# Patient Record
Sex: MALE | Race: BLACK OR AFRICAN AMERICAN | NOT HISPANIC OR LATINO | Employment: OTHER | URBAN - METROPOLITAN AREA
[De-identification: names, ages, dates, MRNs, and addresses within clinical notes are randomized per-mention and may not be internally consistent; named-entity substitution may affect disease eponyms.]

---

## 2017-01-20 ENCOUNTER — OFFICE VISIT (OUTPATIENT)
Dept: INTERNAL MEDICINE | Facility: CLINIC | Age: 42
End: 2017-01-20

## 2017-01-20 VITALS
BODY MASS INDEX: 24.52 KG/M2 | WEIGHT: 181 LBS | HEIGHT: 72 IN | DIASTOLIC BLOOD PRESSURE: 88 MMHG | SYSTOLIC BLOOD PRESSURE: 140 MMHG

## 2017-01-20 DIAGNOSIS — R10.13 EPIGASTRIC PAIN: Primary | ICD-10-CM

## 2017-01-20 LAB
ALBUMIN SERPL-MCNC: 4.5 G/DL (ref 3.2–4.8)
ALBUMIN/GLOB SERPL: 1.6 G/DL (ref 1.5–2.5)
ALP SERPL-CCNC: 58 U/L (ref 25–100)
ALT SERPL W P-5'-P-CCNC: 28 U/L (ref 7–40)
AMYLASE SERPL-CCNC: 56 U/L (ref 30–118)
ANION GAP SERPL CALCULATED.3IONS-SCNC: 9 MMOL/L (ref 3–11)
AST SERPL-CCNC: 27 U/L (ref 0–33)
BILIRUB SERPL-MCNC: 0.7 MG/DL (ref 0.3–1.2)
BUN BLD-MCNC: 11 MG/DL (ref 9–23)
BUN/CREAT SERPL: 13.8 (ref 7–25)
CALCIUM SPEC-SCNC: 10 MG/DL (ref 8.7–10.4)
CHLORIDE SERPL-SCNC: 105 MMOL/L (ref 99–109)
CO2 SERPL-SCNC: 32 MMOL/L (ref 20–31)
CREAT BLD-MCNC: 0.8 MG/DL (ref 0.6–1.3)
DEPRECATED RDW RBC AUTO: 41.1 FL (ref 37–54)
ERYTHROCYTE [DISTWIDTH] IN BLOOD BY AUTOMATED COUNT: 12.2 % (ref 11.3–14.5)
GFR SERPL CREATININE-BSD FRML MDRD: 130 ML/MIN/1.73
GLOBULIN UR ELPH-MCNC: 2.8 GM/DL
GLUCOSE BLD-MCNC: 102 MG/DL (ref 70–100)
HCT VFR BLD AUTO: 44.2 % (ref 38.9–50.9)
HGB BLD-MCNC: 15.1 G/DL (ref 13.1–17.5)
LIPASE SERPL-CCNC: 36 U/L (ref 6–51)
MCH RBC QN AUTO: 31.3 PG (ref 27–31)
MCHC RBC AUTO-ENTMCNC: 34.2 G/DL (ref 32–36)
MCV RBC AUTO: 91.5 FL (ref 80–99)
PLATELET # BLD AUTO: 218 10*3/MM3 (ref 150–450)
PMV BLD AUTO: 10 FL (ref 6–12)
POTASSIUM BLD-SCNC: 4.8 MMOL/L (ref 3.5–5.5)
PROT SERPL-MCNC: 7.3 G/DL (ref 5.7–8.2)
RBC # BLD AUTO: 4.83 10*6/MM3 (ref 4.2–5.76)
SODIUM BLD-SCNC: 146 MMOL/L (ref 132–146)
WBC NRBC COR # BLD: 5.91 10*3/MM3 (ref 3.5–10.8)

## 2017-01-20 PROCEDURE — 85027 COMPLETE CBC AUTOMATED: CPT | Performed by: PHYSICIAN ASSISTANT

## 2017-01-20 PROCEDURE — 99213 OFFICE O/P EST LOW 20 MIN: CPT | Performed by: PHYSICIAN ASSISTANT

## 2017-01-20 PROCEDURE — 83690 ASSAY OF LIPASE: CPT | Performed by: PHYSICIAN ASSISTANT

## 2017-01-20 PROCEDURE — 83013 H PYLORI (C-13) BREATH: CPT | Performed by: PHYSICIAN ASSISTANT

## 2017-01-20 PROCEDURE — 80053 COMPREHEN METABOLIC PANEL: CPT | Performed by: PHYSICIAN ASSISTANT

## 2017-01-20 PROCEDURE — 82150 ASSAY OF AMYLASE: CPT | Performed by: PHYSICIAN ASSISTANT

## 2017-01-20 RX ORDER — OMEPRAZOLE 40 MG/1
40 CAPSULE, DELAYED RELEASE ORAL DAILY
Qty: 30 CAPSULE | Refills: 2 | Status: SHIPPED | OUTPATIENT
Start: 2017-01-20 | End: 2018-02-23

## 2017-01-20 NOTE — MR AVS SNAPSHOT
Alex Fofana   1/20/2017 9:15 AM   Office Visit    Dept Phone:  [phone]   Encounter #:  [id number]    Provider:  HEVER Mckinney   Department:  [facility] INTERNAL MEDICINE AND ENDOCRINOLOGY [place]                Your Full Care Plan              Today's Medication Changes          These changes are accurate as of: 1/20/17  9:53 AM.  If you have any questions, ask your nurse or doctor.               New Medication(s)Ordered:     omeprazole 40 MG capsule   Commonly known as:  priLOSEC   Take 1 capsule by mouth Daily.            Where to Get Your Medications      These medications were sent to [address] - [address] [place] [address] [address] - [phone]  - [phone] [place] [address]     Phone:  [phone]     omeprazole 40 MG capsule                  Your Updated Medication List          This list is accurate as of: 1/20/17  9:53 AM.  Always use your most recent med list.                omeprazole 40 MG capsule   Commonly known as:  priLOSEC   Take 1 capsule by mouth Daily.               We Performed the Following     Amylase     CBC (No Diff)     Comprehensive Metabolic Panel     H. Pylori Breath Test     Lipase       You Were Diagnosed With        Codes Comments    Epigastric pain    -  Primary ICD-10-CM: R10.13  ICD-9-CM: 789.06       Instructions     None    Patient Instructions History      Upcoming Appointments     Visit Type Date Time Department    SAME DAY 1/20/2017  9:15 AM [facility]    FOLLOW UP 2/17/2017  9:00 AM [facility]      [employer] Signup     [facility] [employer] allows you to send messages to your doctor, view your test results, renew your prescriptions, schedule appointments, and more. To sign up, go to [email or web address] and click on the Sign Up Now link in the New User? box. Enter your [employer] Activation Code exactly as it appears below along with the last four digits of your  "Social Security Number and your Date of Birth () to complete the sign-up process. If you do not sign up before the expiration date, you must request a new code.    [employer] Activation Code: [email or web address]  Expires: 2/3/2017  9:53 AM    If you have questions, you can email [email or web address] or call [phone] to talk to our [employer] staff. Remember, [employer] is NOT to be used for urgent needs. For medical emergencies, dial 911.               Other Info from Your Visit           Your Appointments     2017  9:00 AM EST   Follow Up with HEVER Mckinney   [facility] INTERNAL MEDICINE AND ENDOCRINOLOGY [place] (--)    [address] [place] [id number] [phone]           Arrive 15 minutes prior to appointment.              Allergies     Erythromycin        Reason for Visit     Stomach pain comes and goes x3 months           Vital Signs     Blood Pressure Height Weight Body Mass Index          140/88 71.5\" (181.6 cm) 181 lb (82.1 kg) 24.89 kg/m2        Problems and Diagnoses Noted     Abdominal pain, pit of stomach    -  Primary      Results         "

## 2017-01-20 NOTE — ASSESSMENT & PLAN NOTE
Likely esophagitis/gastritis. Check h.pylori, cmp, cbc, amylase, lipase. Start omeprazole daily. Recheck in 4 weeks.

## 2017-01-20 NOTE — PROGRESS NOTES
"Chief Complaint   Patient presents with   • Stomach pain comes and goes x3 months       Subjective   Alex Fofana is a 41 y.o. male.       History of Present Illness     For the past few months pt has been having epigastric pain after he eats. Ate rice and chicken last night and still hurts today. Has not tried anything otc for it. Today feels more nauseated. Normal BM, no vomiting. Does not happen as much after breakfast, seems worse later in the day. Has increased belching. Rarely takes NSAIDs, rare alcohol.    Notes that several years ago he had acid reflux, diagnosed with esophagitis on EGD.         Current Outpatient Prescriptions:   •  omeprazole (priLOSEC) 40 MG capsule, Take 1 capsule by mouth Daily., Disp: 30 capsule, Rfl: 2     PMFSH  The following portions of the patient's history were reviewed and updated as appropriate: allergies, current medications, past family history, past medical history, past social history, past surgical history and problem list.    Review of Systems   Constitutional: Negative for chills, diaphoresis, fatigue, fever and unexpected weight change.   HENT: Negative.    Respiratory: Negative for cough, chest tightness, shortness of breath and wheezing.    Cardiovascular: Negative for chest pain and palpitations.   Gastrointestinal: Positive for abdominal pain and nausea. Negative for blood in stool, constipation, diarrhea, rectal pain and vomiting.   Genitourinary: Negative.    Musculoskeletal: Negative.    Neurological: Negative for dizziness, weakness and light-headedness.   Psychiatric/Behavioral: Negative.        Objective   Visit Vitals   • /88   • Ht 71.5\" (181.6 cm)   • Wt 181 lb (82.1 kg)   • BMI 24.89 kg/m2       Physical Exam   Constitutional: He is oriented to person, place, and time. He appears well-developed and well-nourished.   HENT:   Head: Normocephalic and atraumatic.   Right Ear: External ear normal.   Left Ear: External ear normal.   Eyes: Conjunctivae are " normal. Pupils are equal, round, and reactive to light.   Neck: Normal range of motion.   Pulmonary/Chest: Effort normal.   Abdominal: Soft. Normal appearance and bowel sounds are normal. There is hepatosplenomegaly. There is tenderness in the epigastric area. There is no rigidity, no rebound, no guarding and no CVA tenderness.   Musculoskeletal: Normal range of motion.   Neurological: He is alert and oriented to person, place, and time.   Skin: Skin is warm and dry.   Psychiatric: He has a normal mood and affect. His behavior is normal. Judgment and thought content normal.       No results found for this or any previous visit.     ASSESSMENT/PLAN    Problem List Items Addressed This Visit        Nervous and Auditory    Epigastric pain - Primary     Likely esophagitis/gastritis. Check h.pylori, cmp, cbc, amylase, lipase. Start omeprazole daily. Recheck in 4 weeks.         Relevant Medications    omeprazole (priLOSEC) 40 MG capsule    Other Relevant Orders    Comprehensive Metabolic Panel    CBC (No Diff)    Amylase    Lipase    H. Pylori Breath Test               Return in about 4 weeks (around 2/17/2017) for Recheck.

## 2017-01-26 LAB — UREA BREATH TEST QL: NEGATIVE

## 2017-06-16 ENCOUNTER — OFFICE VISIT (OUTPATIENT)
Dept: INTERNAL MEDICINE | Facility: CLINIC | Age: 42
End: 2017-06-16

## 2017-06-16 VITALS
DIASTOLIC BLOOD PRESSURE: 80 MMHG | HEART RATE: 68 BPM | BODY MASS INDEX: 24.34 KG/M2 | OXYGEN SATURATION: 99 % | WEIGHT: 177 LBS | SYSTOLIC BLOOD PRESSURE: 144 MMHG

## 2017-06-16 DIAGNOSIS — M54.16 RIGHT LUMBAR RADICULOPATHY: Primary | ICD-10-CM

## 2017-06-16 PROCEDURE — 99213 OFFICE O/P EST LOW 20 MIN: CPT | Performed by: PHYSICIAN ASSISTANT

## 2017-06-16 RX ORDER — METHYLPREDNISOLONE 4 MG/1
TABLET ORAL
Qty: 21 TABLET | Refills: 0 | Status: SHIPPED | OUTPATIENT
Start: 2017-06-16 | End: 2018-02-23

## 2017-06-16 RX ORDER — CYCLOBENZAPRINE HCL 5 MG
5 TABLET ORAL 3 TIMES DAILY PRN
Qty: 30 TABLET | Refills: 0 | Status: SHIPPED | OUTPATIENT
Start: 2017-06-16 | End: 2018-02-23

## 2017-06-16 NOTE — PROGRESS NOTES
Chief Complaint   Patient presents with   • Lower back and right leg pain x3 weeks       Subjective   Alex Fofana is a 41 y.o. male.       History of Present Illness     About 3 weeks ago pt developed pain in his lower back. 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 He woke up with the pain in his back and radiation down his left leg. Went to chiropractor and radiation down his left leg resolved and then moved to the right side. Has pain all the time, worse with certain positions, not much better at any times. Tried ibuprofen once without any relief. His job does not seem to make it worse. Sometimes sitting for long periods is worse.      Current Outpatient Prescriptions:   •  omeprazole (priLOSEC) 40 MG capsule, Take 1 capsule by mouth Daily., Disp: 30 capsule, Rfl: 2  •  cyclobenzaprine (FLEXERIL) 5 MG tablet, Take 1 tablet by mouth 3 (Three) Times a Day As Needed for Muscle Spasms., Disp: 30 tablet, Rfl: 0  •  MethylPREDNISolone (MEDROL) 4 MG tablet, follow package directions, Disp: 21 tablet, Rfl: 0     PMFSH  The following portions of the patient's history were reviewed and updated as appropriate: allergies, current medications, past family history, past medical history, past social history, past surgical history and problem list.    Review of Systems   Constitutional: Negative for appetite change, fever and unexpected weight change.   HENT: Negative.    Eyes: Negative for pain and visual disturbance.   Respiratory: Negative for chest tightness, shortness of breath and wheezing.    Cardiovascular: Negative for chest pain and palpitations.   Gastrointestinal: Negative for abdominal pain, blood in stool, diarrhea, nausea and vomiting.   Endocrine: Negative.    Genitourinary: Negative for difficulty urinating, flank pain, frequency and urgency.   Musculoskeletal: Positive for back pain. Negative for joint swelling.   Skin: Negative for color change and rash.   Neurological: Negative for tremors and  weakness.   Hematological: Negative for adenopathy.   Psychiatric/Behavioral: Negative for confusion and decreased concentration.   All other systems reviewed and are negative.      Objective   /80  Pulse 68  Wt 177 lb (80.3 kg)  SpO2 99%  BMI 24.34 kg/m2    Physical Exam   Constitutional: He is oriented to person, place, and time. He appears well-developed and well-nourished. No distress.   HENT:   Head: Normocephalic and atraumatic.   Eyes: Conjunctivae and EOM are normal. Pupils are equal, round, and reactive to light. No scleral icterus.   Neck: Normal range of motion. Neck supple.   Cardiovascular: Normal rate, regular rhythm and normal heart sounds.  Exam reveals no gallop.    No murmur heard.  Pulmonary/Chest: Effort normal and breath sounds normal. No respiratory distress. He has no wheezes. He has no rales. He exhibits no tenderness.   Abdominal: Soft. There is no tenderness.   Musculoskeletal: He exhibits tenderness. He exhibits no deformity.        Lumbar back: He exhibits normal range of motion, no tenderness, no bony tenderness, no swelling, no pain and no spasm.   +SLR on right   Neurological: He is alert and oriented to person, place, and time. He has normal reflexes. He displays no atrophy, no tremor and normal reflexes. No sensory deficit. He exhibits normal muscle tone. Coordination normal.   Reflex Scores:       Patellar reflexes are 2+ on the right side and 2+ on the left side.       Achilles reflexes are 2+ on the right side and 2+ on the left side.  Skin: Skin is warm and dry. No rash noted. He is not diaphoretic.   Psychiatric: He has a normal mood and affect. His behavior is normal. Judgment and thought content normal.   Nursing note and vitals reviewed.           ASSESSMENT/PLAN    Problem List Items Addressed This Visit        Nervous and Auditory    Right lumbar radiculopathy - Primary     Medrol dose pack and flexeril as directed prn. Pt will schedule PT appt. RTC if worsening  or no improvement.         Relevant Medications    MethylPREDNISolone (MEDROL) 4 MG tablet    cyclobenzaprine (FLEXERIL) 5 MG tablet    Other Relevant Orders    Ambulatory Referral to Physical Therapy Evaluate and treat               Return if symptoms worsen or fail to improve.

## 2017-06-16 NOTE — ASSESSMENT & PLAN NOTE
Medrol dose pack and flexeril as directed prn. Pt will schedule PT appt. RTC if worsening or no improvement.

## 2017-07-19 ENCOUNTER — TELEPHONE (OUTPATIENT)
Dept: INTERNAL MEDICINE | Facility: CLINIC | Age: 42
End: 2017-07-19

## 2017-07-19 DIAGNOSIS — M54.16 RIGHT LUMBAR RADICULOPATHY: Primary | ICD-10-CM

## 2017-07-19 NOTE — TELEPHONE ENCOUNTER
It doesn't look like he has tried PT yet, insurance will  Not pay for MRi without trying PT first.  Please have him proceed with Physical therapy and call back if not better.

## 2017-07-19 NOTE — TELEPHONE ENCOUNTER
I spoke with the patient and he states they did try to get a p.t. appointment and they said it was 100.00 per visit. His insurance doesn't cover physical therapy but does chiropractor. He states he went and it was worse afterwards.  He doesn't have that kind of money.  I suggested he call his insurance and find out if they cover MRI.  Also will ask Ursula for exercises.

## 2017-07-19 NOTE — TELEPHONE ENCOUNTER
Pt called stating he has done everything Dr. Michel has told him to do for his back and he is worse to the point it hurts to sit and requesting to be referred for an MRI    Pt [phone]

## 2018-02-23 ENCOUNTER — OFFICE VISIT (OUTPATIENT)
Dept: INTERNAL MEDICINE | Facility: CLINIC | Age: 43
End: 2018-02-23

## 2018-02-23 VITALS
OXYGEN SATURATION: 99 % | BODY MASS INDEX: 24.92 KG/M2 | DIASTOLIC BLOOD PRESSURE: 78 MMHG | HEART RATE: 72 BPM | HEIGHT: 72 IN | WEIGHT: 184 LBS | TEMPERATURE: 98.8 F | SYSTOLIC BLOOD PRESSURE: 132 MMHG

## 2018-02-23 DIAGNOSIS — J02.9 ACUTE PHARYNGITIS, UNSPECIFIED ETIOLOGY: ICD-10-CM

## 2018-02-23 DIAGNOSIS — R52 BODY ACHES: Primary | ICD-10-CM

## 2018-02-23 LAB
EXPIRATION DATE: NORMAL
EXPIRATION DATE: NORMAL
FLUAV AG NPH QL: NEGATIVE
FLUBV AG NPH QL: NEGATIVE
INTERNAL CONTROL: NORMAL
INTERNAL CONTROL: NORMAL
Lab: NORMAL
Lab: NORMAL
S PYO AG THROAT QL: NEGATIVE

## 2018-02-23 PROCEDURE — 87804 INFLUENZA ASSAY W/OPTIC: CPT | Performed by: NURSE PRACTITIONER

## 2018-02-23 PROCEDURE — 99213 OFFICE O/P EST LOW 20 MIN: CPT | Performed by: NURSE PRACTITIONER

## 2018-02-23 PROCEDURE — 87880 STREP A ASSAY W/OPTIC: CPT | Performed by: NURSE PRACTITIONER

## 2018-02-23 NOTE — PATIENT INSTRUCTIONS
Drink plenty of fluids.  Tylenol for pain/fever  .  Rest.  SEe your PCP if not improving.  Flu and strep screen were negative

## 2018-02-23 NOTE — PROGRESS NOTES
Subjective   Alex Fofana is a 42 y.o. male. Body aches, sore throat, occ  Cough (NP), fatigue.  Onset Wed.  Denies fever.  Has mild HA.  Minimal runny nose.  No ear pain,  SOA, C/P, abd pain.  Drinking hot tea without relief .  Son has strep    Fatigue   Associated symptoms include coughing, fatigue and a sore throat. Pertinent negatives include no abdominal pain, chest pain, fever, headaches, nausea or vomiting.   Sore Throat    Associated symptoms include coughing. Pertinent negatives include no abdominal pain, diarrhea, ear pain, headaches, shortness of breath, stridor or vomiting.        Current Outpatient Prescriptions on File Prior to Visit   Medication Sig Dispense Refill   • [DISCONTINUED] cyclobenzaprine (FLEXERIL) 5 MG tablet Take 1 tablet by mouth 3 (Three) Times a Day As Needed for Muscle Spasms. 30 tablet 0   • [DISCONTINUED] MethylPREDNISolone (MEDROL) 4 MG tablet follow package directions 21 tablet 0   • [DISCONTINUED] omeprazole (priLOSEC) 40 MG capsule Take 1 capsule by mouth Daily. 30 capsule 2     No current facility-administered medications on file prior to visit.        Allergies   Allergen Reactions   • Erythromycin        No past medical history on file.    No past surgical history on file.    No family history on file.    Social History     Social History   • Marital status: Single     Spouse name: N/A   • Number of children: N/A   • Years of education: N/A     Occupational History   • Not on file.     Social History Main Topics   • Smoking status: Never Smoker   • Smokeless tobacco: Never Used   • Alcohol use Not on file   • Drug use: Not on file   • Sexual activity: Not on file     Other Topics Concern   • Not on file     Social History Narrative       Review of Systems   Constitutional: Positive for fatigue. Negative for activity change, appetite change and fever.   HENT: Positive for postnasal drip and sore throat. Negative for ear pain.    Eyes: Negative.    Respiratory: Positive for  "cough. Negative for shortness of breath and stridor.    Cardiovascular: Negative for chest pain.   Gastrointestinal: Negative for abdominal pain, diarrhea, nausea and vomiting.   Neurological: Negative for headaches.       /78  Pulse 72  Temp 98.8 °F (37.1 °C) (Oral)   Ht 181.6 cm (71.5\")  Wt 83.5 kg (184 lb)  SpO2 99%  BMI 25.31 kg/m2    Objective   Physical Exam   Constitutional: He is oriented to person, place, and time. He appears well-developed and well-nourished. No distress.   HENT:   Head: Normocephalic.   Right Ear: External ear normal.   Left Ear: External ear normal.   Mouth/Throat: Oropharynx is clear and moist.   No pain over frontal or maxillary sinuses.  Has lesion to posterior scalp (says it's not changed)   Eyes: Right eye exhibits no discharge. Left eye exhibits no discharge.   Neck: Normal range of motion. Neck supple.   Cardiovascular: Normal rate, regular rhythm, normal heart sounds and intact distal pulses.  Exam reveals no gallop and no friction rub.    No murmur heard.  Pulmonary/Chest: Effort normal and breath sounds normal. No respiratory distress. He has no wheezes. He has no rales. He exhibits no tenderness.   Abdominal: Soft. There is no tenderness.   Lymphadenopathy:     He has no cervical adenopathy.   Neurological: He is alert and oriented to person, place, and time.   Skin: Skin is warm and dry.   No rash, color pink   Nursing note reviewed.      Results for orders placed or performed in visit on 02/23/18   POCT Influenza A/B   Result Value Ref Range    Rapid Influenza A Ag negative     Rapid Influenza B Ag negative     Internal Control Passed Passed    Lot Number [id number]     Expiration Date 01/28/2021      Assessment/Plan   Alex was seen today for fatigue, sore throat and generalized body aches.    Diagnoses and all orders for this visit:    Body aches  -     POCT Influenza A/B    Acute pharyngitis, unspecified etiology        Patient Instructions   Drink plenty of " fluids.  Tylenol for pain/fever  .  Rest.  SEe your PCP if not improving.  Flu and strep screen were negative

## 2018-03-24 ENCOUNTER — OFFICE VISIT (OUTPATIENT)
Dept: INTERNAL MEDICINE | Facility: CLINIC | Age: 43
End: 2018-03-24

## 2018-03-24 VITALS
HEART RATE: 74 BPM | DIASTOLIC BLOOD PRESSURE: 72 MMHG | TEMPERATURE: 98.8 F | SYSTOLIC BLOOD PRESSURE: 122 MMHG | WEIGHT: 187 LBS | OXYGEN SATURATION: 98 % | HEIGHT: 71 IN | BODY MASS INDEX: 26.18 KG/M2

## 2018-03-24 DIAGNOSIS — J01.00 ACUTE MAXILLARY SINUSITIS, RECURRENCE NOT SPECIFIED: Primary | ICD-10-CM

## 2018-03-24 DIAGNOSIS — R05.9 COUGH: ICD-10-CM

## 2018-03-24 PROCEDURE — 99213 OFFICE O/P EST LOW 20 MIN: CPT | Performed by: PHYSICIAN ASSISTANT

## 2018-03-24 RX ORDER — PREDNISONE 20 MG/1
20 TABLET ORAL 2 TIMES DAILY
Qty: 14 TABLET | Refills: 0 | Status: SHIPPED | OUTPATIENT
Start: 2018-03-24 | End: 2019-10-02

## 2018-03-24 RX ORDER — CEFDINIR 300 MG/1
300 CAPSULE ORAL 2 TIMES DAILY
Qty: 20 CAPSULE | Refills: 0 | Status: SHIPPED | OUTPATIENT
Start: 2018-03-24 | End: 2019-10-02

## 2018-03-24 RX ORDER — DEXTROMETHORPHAN HYDROBROMIDE AND PROMETHAZINE HYDROCHLORIDE 15; 6.25 MG/5ML; MG/5ML
2.5 SYRUP ORAL 4 TIMES DAILY PRN
Qty: 120 ML | Refills: 1 | Status: SHIPPED | OUTPATIENT
Start: 2018-03-24 | End: 2019-10-02

## 2018-03-24 NOTE — PROGRESS NOTES
Subjective   Alex Fofana is a 42 y.o. male  URI (Chest congestion, sore throat. Sx started 1 week ago. )      URI    This is a new problem. Associated symptoms include chest pain, congestion, coughing, headaches, sinus pain, a sore throat and swollen glands. Pertinent negatives include no joint swelling. Associated symptoms comments: Cough is been productive.  Green-yellow sputum mild sore throat.  Mild sore throat.  Cough has been worse at night, symptoms of been present for 4 days.       The following portions of the patient's history were reviewed and updated as appropriate: allergies, current medications, past social history and problem list    Review of Systems   HENT: Positive for congestion, postnasal drip, sinus pain and sore throat.    Respiratory: Positive for cough.    Cardiovascular: Positive for chest pain.   Neurological: Positive for headaches.       Objective     Vitals:    03/24/18 1201   BP: 122/72   Pulse: 74   Temp: 98.8 °F (37.1 °C)   SpO2: 98%       Physical Exam   Constitutional: He appears well-developed and well-nourished.   HENT:   Head: Normocephalic and atraumatic.   Right Ear: Tympanic membrane and ear canal normal.   Left Ear: Tympanic membrane and ear canal normal.   Nose: Mucosal edema, rhinorrhea and sinus tenderness present. Right sinus exhibits maxillary sinus tenderness and frontal sinus tenderness. Left sinus exhibits maxillary sinus tenderness and frontal sinus tenderness.   Mouth/Throat: Oropharynx is clear and moist. No oropharyngeal exudate.   Eyes: Pupils are equal, round, and reactive to light.   Cardiovascular: Normal rate and regular rhythm.    Pulmonary/Chest: Effort normal and breath sounds normal.   Nursing note and vitals reviewed.      Assessment/Plan     Diagnoses and all orders for this visit:    Acute maxillary sinusitis, recurrence not specified  -     cefdinir (OMNICEF) 300 MG capsule; Take 1 capsule by mouth 2 (Two) Times a Day.  -     predniSONE (DELTASONE)  20 MG tablet; Take 1 tablet by mouth 2 (Two) Times a Day.  -     promethazine-dextromethorphan (PROMETHAZINE-DM) 6.25-15 MG/5ML syrup; Take 2.5 mL by mouth 4 (Four) Times a Day As Needed for Cough.    Cough  -     cefdinir (OMNICEF) 300 MG capsule; Take 1 capsule by mouth 2 (Two) Times a Day.  -     predniSONE (DELTASONE) 20 MG tablet; Take 1 tablet by mouth 2 (Two) Times a Day.  -     promethazine-dextromethorphan (PROMETHAZINE-DM) 6.25-15 MG/5ML syrup; Take 2.5 mL by mouth 4 (Four) Times a Day As Needed for Cough.    Follow-up if no better

## 2018-04-01 ENCOUNTER — TELEPHONE (OUTPATIENT)
Dept: INTERNAL MEDICINE | Facility: CLINIC | Age: 43
End: 2018-04-01

## 2018-04-01 NOTE — TELEPHONE ENCOUNTER
PT SAW KATHERINE SARMIENTO 03/24/2018 FOR ACUTE SINUSITIS. HE WAS PRESCRIBED A LOW DOSE ANTIBIOTIC. HE SAYS IT IS NOT WORKING WELL. HE WOULD LIKE A STRONGER PRESCRIPTION CALLED IN ASAP. HE GOES ON VACATION TOMORROW

## 2018-04-02 RX ORDER — AMOXICILLIN AND CLAVULANATE POTASSIUM 875; 125 MG/1; MG/1
1 TABLET, FILM COATED ORAL EVERY 12 HOURS SCHEDULED
Qty: 20 TABLET | Refills: 0 | Status: SHIPPED | OUTPATIENT
Start: 2018-04-02 | End: 2018-04-02 | Stop reason: SDUPTHER

## 2018-04-02 RX ORDER — AMOXICILLIN AND CLAVULANATE POTASSIUM 875; 125 MG/1; MG/1
1 TABLET, FILM COATED ORAL EVERY 12 HOURS SCHEDULED
Qty: 20 TABLET | Refills: 0 | Status: SHIPPED | OUTPATIENT
Start: 2018-04-02 | End: 2019-10-02

## 2018-04-02 NOTE — TELEPHONE ENCOUNTER
I spoke with patient and he stated that the antibiotic that was given hasnt helped any and would like something else sent in. He is leaving this morning for [place], he asked if you could change the antibiotic and have it sent to a pharmacy their? He is not sure what pharmacy until he gets their, informed pt I would send you a message and see if you could change med and then I would call him and see what pharmacy then I can call med in.

## 2018-12-28 ENCOUNTER — TELEPHONE (OUTPATIENT)
Dept: INTERNAL MEDICINE | Facility: CLINIC | Age: 43
End: 2018-12-28

## 2019-10-02 ENCOUNTER — OFFICE VISIT (OUTPATIENT)
Dept: INTERNAL MEDICINE | Facility: CLINIC | Age: 44
End: 2019-10-02

## 2019-10-02 VITALS
RESPIRATION RATE: 18 BRPM | OXYGEN SATURATION: 99 % | WEIGHT: 177 LBS | BODY MASS INDEX: 23.98 KG/M2 | SYSTOLIC BLOOD PRESSURE: 142 MMHG | TEMPERATURE: 98.6 F | DIASTOLIC BLOOD PRESSURE: 88 MMHG | HEIGHT: 72 IN | HEART RATE: 86 BPM

## 2019-10-02 DIAGNOSIS — Z00.00 HEALTHCARE MAINTENANCE: Primary | ICD-10-CM

## 2019-10-02 DIAGNOSIS — Z00.00 ANNUAL PHYSICAL EXAM: ICD-10-CM

## 2019-10-02 DIAGNOSIS — M53.3 SACRAL BACK PAIN: ICD-10-CM

## 2019-10-02 PROBLEM — M54.2 CERVICAL PAIN: Status: ACTIVE | Noted: 2019-10-02

## 2019-10-02 PROBLEM — M75.50 BURSITIS OF SHOULDER: Status: ACTIVE | Noted: 2019-10-02

## 2019-10-02 LAB
BILIRUB BLD-MCNC: NEGATIVE MG/DL
CLARITY, POC: CLEAR
COLOR UR: YELLOW
GLUCOSE UR STRIP-MCNC: NEGATIVE MG/DL
KETONES UR QL: NEGATIVE
LEUKOCYTE EST, POC: NEGATIVE
NITRITE UR-MCNC: NEGATIVE MG/ML
PH UR: 7 [PH] (ref 5–8)
PROT UR STRIP-MCNC: NEGATIVE MG/DL
RBC # UR STRIP: NEGATIVE /UL
SP GR UR: 1.01 (ref 1–1.03)
UROBILINOGEN UR QL: NORMAL

## 2019-10-02 PROCEDURE — 80061 LIPID PANEL: CPT | Performed by: NURSE PRACTITIONER

## 2019-10-02 PROCEDURE — 99396 PREV VISIT EST AGE 40-64: CPT | Performed by: NURSE PRACTITIONER

## 2019-10-02 PROCEDURE — G0480 DRUG TEST DEF 1-7 CLASSES: HCPCS | Performed by: NURSE PRACTITIONER

## 2019-10-02 PROCEDURE — 81003 URINALYSIS AUTO W/O SCOPE: CPT | Performed by: NURSE PRACTITIONER

## 2019-10-02 PROCEDURE — 85025 COMPLETE CBC W/AUTO DIFF WBC: CPT | Performed by: NURSE PRACTITIONER

## 2019-10-02 PROCEDURE — 80053 COMPREHEN METABOLIC PANEL: CPT | Performed by: NURSE PRACTITIONER

## 2019-10-02 NOTE — PROGRESS NOTES
"Subjective   Alex Fofana is a 43 y.o. male.   Chief Complaint   Patient presents with   • Flank Pain     Left, x week and half   • Nausea   • Annual Exam      History of Present Illness as above.  Patient thought the left flank pain was due to volume depletion.  He believes he got dehydrated at work and after that the left flank started hurting.  He denies trauma.  Patient denies fever chills, headache, ear pain, sore throat, shortness of air, cough, wheezing, chest pain, abdominal pain.  Some nausea ?RT flank pain. No vomiting, diarrhea, dysuria, blood in stool or urine.  Mood is good.  Eating and drinking as usual.  No unexplained weight loss or gain.      Social: He is .  Does not smoke, vape, use alcohol, recreational drugs, CBD oil.    The following portions of the patient's history were reviewed and updated as appropriate: allergies, current medications, past family history, past medical history, past social history, past surgical history and problem list.  No current outpatient medications on file.    Review of Systems  /88   Pulse 86   Temp 98.6 °F (37 °C)   Resp 18   Ht 182.9 cm (72\")   Wt 80.3 kg (177 lb)   SpO2 99%   BMI 24.01 kg/m²     Objective   Allergies   Allergen Reactions   • Erythromycin        Physical Exam   Constitutional: He is oriented to person, place, and time. He appears well-developed and well-nourished.   HENT:   Head: Normocephalic and atraumatic.   Right Ear: External ear normal.   Left Ear: External ear normal.   Mouth/Throat: Oropharynx is clear and moist.   Eyes: Pupils are equal, round, and reactive to light. Right eye exhibits no discharge. Left eye exhibits no discharge. No scleral icterus.   Neck: Neck supple. No thyromegaly present.   Cardiovascular: Normal rate, regular rhythm, normal heart sounds and intact distal pulses. Exam reveals no gallop and no friction rub.   No murmur heard.  Pulmonary/Chest: Effort normal and breath sounds normal. No stridor. " "No respiratory distress. He has no wheezes. He has no rales.   Abdominal: Soft. Bowel sounds are normal. He exhibits no mass. There is no tenderness.   Musculoskeletal:   Tender to the SI joint left side.  There is no step-off or deformities noted.  Full range of motion to the back.  Straight leg raise is negative   Lymphadenopathy:     He has no cervical adenopathy.   Neurological: He is alert and oriented to person, place, and time.   Skin: Skin is warm and dry. Capillary refill takes less than 2 seconds.   Is pink, no rash    Psychiatric: He has a normal mood and affect. His behavior is normal. Judgment and thought content normal.   Nursing note and vitals reviewed.      Procedures      Assessment/Plan   Alex was seen today for flank pain, nausea and annual exam.    Diagnoses and all orders for this visit:    Healthcare maintenance  -     Comprehensive Metabolic Panel  -     Lipid Panel  -     Nicotine Screen, Urine - Urine, Clean Catch; Future  -     Nicotine Screen, Urine - Urine, Clean Catch    Sacral back pain  -     POCT urinalysis dipstick, automated  -     CBC & Differential  -     CBC Auto Differential    Annual physical exam  -     Comprehensive Metabolic Panel  -     Lipid Panel  -     Nicotine Screen, Urine - Urine, Clean Catch; Future  -     Nicotine Screen, Urine - Urine, Clean Catch    Other orders  -     Cancel: Basic metabolic panel      Patient Instructions   Labs as discussed.  Try moist heat to the sore area of your back.  Try Aleve twice daily to help with pain.  May also use Tylenol.  Return to the clinic if you continue to have problems with your back otherwise we will see you in 1 year for your annual exam health Education:  Heart healthy diet to include fresh fruits and vegetables.  Limit intake of red meats.  Increase chicken and fish in diet.  Avoid fried greasy foods.  Daily activity working up 30 minutes of brisk exercise daily.  Adequate sleep and \"down time\".  Recommend hepatitis A " and flu vaccines. Pt verbalizes understanding and agreement with plan of care.       EM[employer] Dragon/transcription disclaimer:  Please note that portions of this note were completed with a voice recognition program.  Electronic transcription of the voice recognition program may permit erroneous words or phrases to be inadvertently transcribed.  Although I have reviewed the note for such errors, some may still exist in this documentation       Maude Sheppard, APRN

## 2019-10-03 ENCOUNTER — TELEPHONE (OUTPATIENT)
Dept: INTERNAL MEDICINE | Facility: CLINIC | Age: 44
End: 2019-10-03

## 2019-10-03 LAB
ALBUMIN SERPL-MCNC: 4.6 G/DL (ref 3.5–5.2)
ALBUMIN/GLOB SERPL: 2 G/DL
ALP SERPL-CCNC: 53 U/L (ref 39–117)
ALT SERPL W P-5'-P-CCNC: 12 U/L (ref 1–41)
ANION GAP SERPL CALCULATED.3IONS-SCNC: 7.9 MMOL/L (ref 5–15)
AST SERPL-CCNC: 13 U/L (ref 1–40)
BASOPHILS # BLD AUTO: 0.04 10*3/MM3 (ref 0–0.2)
BASOPHILS NFR BLD AUTO: 0.6 % (ref 0–1.5)
BILIRUB SERPL-MCNC: 0.6 MG/DL (ref 0.2–1.2)
BUN BLD-MCNC: 8 MG/DL (ref 6–20)
BUN/CREAT SERPL: 10.1 (ref 7–25)
CALCIUM SPEC-SCNC: 9 MG/DL (ref 8.6–10.5)
CHLORIDE SERPL-SCNC: 101 MMOL/L (ref 98–107)
CHOLEST SERPL-MCNC: 142 MG/DL (ref 0–200)
CO2 SERPL-SCNC: 30.1 MMOL/L (ref 22–29)
CREAT BLD-MCNC: 0.79 MG/DL (ref 0.76–1.27)
DEPRECATED RDW RBC AUTO: 40 FL (ref 37–54)
EOSINOPHIL # BLD AUTO: 0.13 10*3/MM3 (ref 0–0.4)
EOSINOPHIL NFR BLD AUTO: 2 % (ref 0.3–6.2)
ERYTHROCYTE [DISTWIDTH] IN BLOOD BY AUTOMATED COUNT: 12.2 % (ref 12.3–15.4)
GFR SERPL CREATININE-BSD FRML MDRD: 130 ML/MIN/1.73
GLOBULIN UR ELPH-MCNC: 2.3 GM/DL
GLUCOSE BLD-MCNC: 85 MG/DL (ref 65–99)
HCT VFR BLD AUTO: 40.9 % (ref 37.5–51)
HDLC SERPL-MCNC: 64 MG/DL (ref 40–60)
HGB BLD-MCNC: 13.9 G/DL (ref 13–17.7)
IMM GRANULOCYTES # BLD AUTO: 0.02 10*3/MM3 (ref 0–0.05)
IMM GRANULOCYTES NFR BLD AUTO: 0.3 % (ref 0–0.5)
LDLC SERPL CALC-MCNC: 73 MG/DL (ref 0–100)
LDLC/HDLC SERPL: 1.14 {RATIO}
LYMPHOCYTES # BLD AUTO: 1.82 10*3/MM3 (ref 0.7–3.1)
LYMPHOCYTES NFR BLD AUTO: 28 % (ref 19.6–45.3)
MCH RBC QN AUTO: 31 PG (ref 26.6–33)
MCHC RBC AUTO-ENTMCNC: 34 G/DL (ref 31.5–35.7)
MCV RBC AUTO: 91.1 FL (ref 79–97)
MONOCYTES # BLD AUTO: 0.53 10*3/MM3 (ref 0.1–0.9)
MONOCYTES NFR BLD AUTO: 8.2 % (ref 5–12)
NEUTROPHILS # BLD AUTO: 3.96 10*3/MM3 (ref 1.7–7)
NEUTROPHILS NFR BLD AUTO: 60.9 % (ref 42.7–76)
NRBC BLD AUTO-RTO: 0 /100 WBC (ref 0–0.2)
PLATELET # BLD AUTO: 201 10*3/MM3 (ref 140–450)
PMV BLD AUTO: 9.9 FL (ref 6–12)
POTASSIUM BLD-SCNC: 4.2 MMOL/L (ref 3.5–5.2)
PROT SERPL-MCNC: 6.9 G/DL (ref 6–8.5)
RBC # BLD AUTO: 4.49 10*6/MM3 (ref 4.14–5.8)
SODIUM BLD-SCNC: 139 MMOL/L (ref 136–145)
TRIGL SERPL-MCNC: 25 MG/DL (ref 0–150)
VLDLC SERPL-MCNC: 5 MG/DL (ref 5–40)
WBC NRBC COR # BLD: 6.5 10*3/MM3 (ref 3.4–10.8)

## 2019-10-03 NOTE — TELEPHONE ENCOUNTER
Patient would like to get the results of his blood work he had done yesterday and would like to get a call back at [phone]

## 2019-10-03 NOTE — PATIENT INSTRUCTIONS
"Labs as discussed.  Try moist heat to the sore area of your back.  Try Aleve twice daily to help with pain.  May also use Tylenol.  Return to the clinic if you continue to have problems with your back otherwise we will see you in 1 year for your annual exam health Education:  Heart healthy diet to include fresh fruits and vegetables.  Limit intake of red meats.  Increase chicken and fish in diet.  Avoid fried greasy foods.  Daily activity working up 30 minutes of brisk exercise daily.  Adequate sleep and \"down time\".  Recommend hepatitis A and flu vaccines. Pt verbalizes understanding and agreement with plan of care.   "

## 2019-10-04 LAB
COTININE UR-MCNC: NEGATIVE NG/ML
Lab: NORMAL

## 2020-02-20 ENCOUNTER — TELEPHONE (OUTPATIENT)
Dept: INTERNAL MEDICINE | Facility: CLINIC | Age: 45
End: 2020-02-20

## 2020-02-20 NOTE — TELEPHONE ENCOUNTER
LVM FOR PT ABOUT EST CARE WITH ANOTHER PROVIDER IN THE OFFICE, PT NEEDS TO SCHEDULE AS A NEW PT ESTABLISHING CARE, FORMER PT OF [facility]

## 2021-10-12 ENCOUNTER — OFFICE VISIT (OUTPATIENT)
Dept: FAMILY MEDICINE CLINIC | Facility: CLINIC | Age: 46
End: 2021-10-12

## 2021-10-12 VITALS
HEART RATE: 88 BPM | OXYGEN SATURATION: 99 % | DIASTOLIC BLOOD PRESSURE: 90 MMHG | TEMPERATURE: 98.7 F | BODY MASS INDEX: 25.52 KG/M2 | HEIGHT: 72 IN | SYSTOLIC BLOOD PRESSURE: 140 MMHG | WEIGHT: 188.4 LBS

## 2021-10-12 DIAGNOSIS — R03.0 ELEVATED BLOOD PRESSURE READING: ICD-10-CM

## 2021-10-12 DIAGNOSIS — R05.9 COUGH: Primary | ICD-10-CM

## 2021-10-12 DIAGNOSIS — J30.2 SEASONAL ALLERGIC RHINITIS, UNSPECIFIED TRIGGER: ICD-10-CM

## 2021-10-12 PROCEDURE — 99203 OFFICE O/P NEW LOW 30 MIN: CPT | Performed by: PHYSICIAN ASSISTANT

## 2021-10-12 PROCEDURE — U0004 COV-19 TEST NON-CDC HGH THRU: HCPCS | Performed by: PHYSICIAN ASSISTANT

## 2021-10-12 RX ORDER — LORATADINE 10 MG/1
10 TABLET ORAL DAILY
COMMUNITY

## 2021-10-12 RX ORDER — FLUTICASONE PROPIONATE 50 MCG
2 SPRAY, SUSPENSION (ML) NASAL DAILY
Qty: 18.2 ML | Refills: 2 | Status: SHIPPED | OUTPATIENT
Start: 2021-10-12

## 2021-10-12 NOTE — PATIENT INSTRUCTIONS
"I recommend purchasing an automated upper arm blood pressure device (if you do not already own a blood pressure cuff).   The [employer] brand digital devices receive good reviews. Some models are relatively inexpensive and can be purchased for $25-$40 on [employer]. You may be able to acquire them locally at [employer]), or pharmacies as well. Price-checking three different retailers may help you get the best deal.   An ideal setting for measuring your blood pressure is after you have been seated or resting for at least 10-15 minutes with your test arm supported. The kitchen table is often convenient.   Please measure your blood pressure 3 consecutive times and average the readings together to obtain one blood pressure measurement.   I would like for you to measure your blood pressure on 3-4 days per week in the morning and the evening. Allow at least 1 hour after dosing blood pressure medications before measuring your blood pressure.   Keep a written log of your readings and bring it and your blood pressure cuff with you to your next appointment.   · Your average blood pressures should remain below 140/90. If you observe persistently higher measurements for more than 2 weeks, please call our office.      · Use Coricidin HBP cough and cold over-the-counter as needed for coughAttain adequate rest and increase clear fluid intake.   · Practice regular hand hygiene and cover your cough to help prevent spread of infection  · Use warm salt water gargles, lozenges, and hot tea with honey as needed for throat comfort.   · Use Mucinex 600 mg twice daily and \"ocean\" or \"simply saline\" brand sterile nasal spray twice daily.   · Use Loratadine (Claritin), Cetrizine (Zyrtec), or Fexofenadine (Allegra) once daily over the counter and Flonase 2 sprays in each nostril daily    Please keep me informed of any acute changes in your status including new or worsening symptoms. " "    https://www.nhlbi.nih.gov/files/docs/public/heart/dash_brief.pdf\">   DASH Eating Plan  DASH stands for Dietary Approaches to Stop Hypertension. The DASH eating plan is a healthy eating plan that has been shown to:  · Reduce high blood pressure (hypertension).  · Reduce your risk for type 2 diabetes, heart disease, and stroke.  · Help with weight loss.  What are tips for following this plan?  Reading food labels  · Check food labels for the amount of salt (sodium) per serving. Choose foods with less than 5 percent of the Daily Value of sodium. Generally, foods with less than 300 milligrams (mg) of sodium per serving fit into this eating plan.  · To find whole grains, look for the word \"whole\" as the first word in the ingredient list.  Shopping  · Buy products labeled as \"low-sodium\" or \"no salt added.\"  · Buy fresh foods. Avoid canned foods and pre-made or frozen meals.  Cooking  · Avoid adding salt when cooking. Use salt-free seasonings or herbs instead of table salt or sea salt. Check with your health care provider or pharmacist before using salt substitutes.  · Do not hein foods. Cook foods using healthy methods such as baking, boiling, grilling, roasting, and broiling instead.  · Cook with heart-healthy oils, such as olive, canola, avocado, soybean, or sunflower oil.  Meal planning    · Eat a balanced diet that includes:  ? 4 or more servings of fruits and 4 or more servings of vegetables each day. Try to fill one-half of your plate with fruits and vegetables.  ? 6-8 servings of whole grains each day.  ? Less than 6 oz (170 g) of lean meat, poultry, or fish each day. A 3-oz (85-g) serving of meat is about the same size as a deck of cards. One egg equals 1 oz (28 g).  ? 2-3 servings of low-fat dairy each day. One serving is 1 cup (237 mL).  ? 1 serving of nuts, seeds, or beans 5 times each week.  ? 2-3 servings of heart-healthy fats. Healthy fats called omega-3 fatty acids are found in foods such as walnuts, " flaxseeds, fortified milks, and eggs. These fats are also found in cold-water fish, such as sardines, salmon, and mackerel.  · Limit how much you eat of:  ? Canned or prepackaged foods.  ? Food that is high in trans fat, such as some fried foods.  ? Food that is high in saturated fat, such as fatty meat.  ? Desserts and other sweets, sugary drinks, and other foods with added sugar.  ? Full-fat dairy products.  · Do not salt foods before eating.  · Do not eat more than 4 egg yolks a week.  · Try to eat at least 2 vegetarian meals a week.  · Eat more home-cooked food and less restaurant, buffet, and fast food.    Lifestyle  · When eating at a restaurant, ask that your food be prepared with less salt or no salt, if possible.  · If you drink alcohol:  ? Limit how much you use to:  § 0-1 drink a day for women who are not pregnant.  § 0-2 drinks a day for men.  ? Be aware of how much alcohol is in your drink. In the [place] one drink equals one 12 oz bottle of beer (355 mL), one 5 oz glass of wine (148 mL), or one 1½ oz glass of hard liquor (44 mL).  General information  · Avoid eating more than 2,300 mg of salt a day. If you have hypertension, you may need to reduce your sodium intake to 1,500 mg a day.  · Work with your health care provider to maintain a healthy body weight or to lose weight. Ask what an ideal weight is for you.  · Get at least 30 minutes of exercise that causes your heart to beat faster (aerobic exercise) most days of the week. Activities may include walking, swimming, or biking.  · Work with your health care provider or dietitian to adjust your eating plan to your individual calorie needs.  What foods should I eat?  Fruits  All fresh, dried, or frozen fruit. Canned fruit in natural juice (without added sugar).  Vegetables  Fresh or frozen vegetables (raw, steamed, roasted, or grilled). Low-sodium or reduced-sodium tomato and vegetable juice. Low-sodium or reduced-sodium tomato sauce and tomato paste.  Low-sodium or reduced-sodium canned vegetables.  Grains  Whole-grain or whole-wheat bread. Whole-grain or whole-wheat pasta. Brown rice. Oatmeal. Quinoa. Bulgur. Whole-grain and low-sodium cereals. [place] bread. Low-fat, low-sodium crackers. Whole-wheat flour tortillas.  Meats and other proteins  Skinless chicken or turkey. Ground chicken or turkey. Pork with fat trimmed off. Fish and seafood. Egg whites. Dried beans, peas, or lentils. Unsalted nuts, nut butters, and seeds. Unsalted canned beans. Lean cuts of beef with fat trimmed off. Low-sodium, lean precooked or cured meat, such as sausages or meat loaves.  Dairy  Low-fat (1%) or fat-free (skim) milk. Reduced-fat, low-fat, or fat-free cheeses. Nonfat, low-sodium ricotta or cottage cheese. Low-fat or nonfat yogurt. Low-fat, low-sodium cheese.  Fats and oils  Soft margarine without trans fats. Vegetable oil. Reduced-fat, low-fat, or light mayonnaise and salad dressings (reduced-sodium). Canola, safflower, olive, avocado, soybean, and sunflower oils. Avocado.  Seasonings and condiments  Herbs. Spices. Seasoning mixes without salt.  Other foods  Unsalted popcorn and pretzels. Fat-free sweets.  The items listed above may not be a complete list of foods and beverages you can eat. Contact a dietitian for more information.  What foods should I avoid?  Fruits  Canned fruit in a light or heavy syrup. Fried fruit. Fruit in cream or butter sauce.  Vegetables  Creamed or fried vegetables. Vegetables in a cheese sauce. Regular canned vegetables (not low-sodium or reduced-sodium). Regular canned tomato sauce and paste (not low-sodium or reduced-sodium). Regular tomato and vegetable juice (not low-sodium or reduced-sodium). Pickles. Olives.  Grains  Baked goods made with fat, such as croissants, muffins, or some breads. Dry pasta or rice meal packs.  Meats and other proteins  Fatty cuts of meat. Ribs. Fried meat. Layton. Bologna, salami, and other precooked or cured meats, such as  sausages or meat loaves. Fat from the back of a pig (fatback). Bratwurst. Salted nuts and seeds. Canned beans with added salt. Canned or smoked fish. Whole eggs or egg yolks. Chicken or turkey with skin.  Dairy  Whole or 2% milk, cream, and half-and-half. Whole or full-fat cream cheese. Whole-fat or sweetened yogurt. Full-fat cheese. Nondairy creamers. Whipped toppings. Processed cheese and cheese spreads.  Fats and oils  Butter. Stick margarine. Lard. Shortening. Ghee. Layton fat. Tropical oils, such as coconut, palm kernel, or palm oil.  Seasonings and condiments  Onion salt, garlic salt, seasoned salt, table salt, and sea salt. Worcestershire sauce. Tartar sauce. Barbecue sauce. Teriyaki sauce. Soy sauce, including reduced-sodium. Steak sauce. Canned and packaged gravies. Fish sauce. Oyster sauce. Cocktail sauce. Store-bought horseradish. Ketchup. Mustard. Meat flavorings and tenderizers. Bouillon cubes. Hot sauces. Pre-made or packaged marinades. Pre-made or packaged taco seasonings. Relishes. Regular salad dressings.  Other foods  Salted popcorn and pretzels.  The items listed above may not be a complete list of foods and beverages you should avoid. Contact a dietitian for more information.  Where to find more information  · National Heart, Lung, and Blood [place]: www.nhlbi.nih.gov  · American Heart Association: www.heart.org  · Academy of Nutrition and Dietetics: www.eatright.org  · National Kidney Foundation: www.kidney.org  Summary  · The DASH eating plan is a healthy eating plan that has been shown to reduce high blood pressure (hypertension). It may also reduce your risk for type 2 diabetes, heart disease, and stroke.  · When on the DASH eating plan, aim to eat more fresh fruits and vegetables, whole grains, lean proteins, low-fat dairy, and heart-healthy fats.  · With the DASH eating plan, you should limit salt (sodium) intake to 2,300 mg a day. If you have hypertension, you may need to reduce your  sodium intake to 1,500 mg a day.  · Work with your health care provider or dietitian to adjust your eating plan to your individual calorie needs.  This information is not intended to replace advice given to you by your health care provider. Make sure you discuss any questions you have with your health care provider.  Document Revised: 11/20/2020 Document Reviewed: 11/20/2020  [employer] Patient Education © 2021 [employer]

## 2021-10-12 NOTE — PROGRESS NOTES
"    Chief Complaint   Patient presents with   • Establish Care   • Nasal Congestion   • Cough       HPI     Alex Osman Fofana is a pleasant 45 y.o. male with a PMH of seasonal allergic rhinitis who presents for evaluation of \"chief complaint.\"     Patient c/o cough, congestion, mostly clear sputum since for 4 days. He feels well and states he has a good appetite good. Symptoms are making it difficult for him to rest at night. Denies known COVID-19 exposure, fever, shortness of breath, body aches, headache. There is no history of asthma, tobacco use, COPD. Using mucinex, claritin.    States his blood pressure today is better than what it usually is. He has been drinking chicken noodle soup and broth. He does have a family history of hypertension. He states he was told at one time to take medication for his blood pressure but decided to change in diet instead and it came down. His blood pressure used to be checked when he worked at a [employer] last year. He states it was always high there. He does not check his blood pressure at home or since he changed jobs in December.      Past Medical History:   Diagnosis Date   • GERD (gastroesophageal reflux disease)    • Hypertension    • Migraines        History reviewed. No pertinent surgical history.    Family History   Problem Relation Age of Onset   • Diabetes Mother    • Heart attack Mother    • Hypertension Mother    • Kidney disease Mother    • Arthritis Father    • Pancreatic cancer Sister    • Hypertension Son    • Breast cancer Maternal Aunt        Social History     Socioeconomic History   • Marital status:    Tobacco Use   • Smoking status: Never Smoker   • Smokeless tobacco: Never Used   Substance and Sexual Activity   • Alcohol use: Not Currently   • Drug use: Not Currently       Allergies   Allergen Reactions   • Erythromycin        ROS    Review of Systems   Constitutional: Negative for chills and fever.   HENT: Positive for congestion, postnasal drip " and rhinorrhea. Negative for sore throat.    Respiratory: Positive for cough. Negative for shortness of breath and wheezing.    Cardiovascular: Negative for chest pain.   Gastrointestinal: Negative for diarrhea, nausea and vomiting.   Musculoskeletal: Negative for myalgias.   Neurological: Negative for headache.       Vitals:    10/12/21 1144   BP: 140/90   Pulse: 88   Temp: 98.7 °F (37.1 °C)   SpO2: 99%     Body mass index is 25.55 kg/m².      Current Outpatient Medications:   •  loratadine (CLARITIN) 10 MG tablet, Take 10 mg by mouth Daily., Disp: , Rfl:   •  fluticasone (Flonase) 50 MCG/ACT nasal spray, 2 sprays into the nostril(s) as directed by provider Daily., Disp: 18.2 mL, Rfl: 2    PE    Physical Exam  Vitals reviewed.   Constitutional:       General: He is not in acute distress.     Appearance: He is well-developed.   HENT:      Head: Normocephalic.      Right Ear: Tympanic membrane and ear canal normal. Tympanic membrane is not erythematous.      Left Ear: Tympanic membrane and ear canal normal. Tympanic membrane is not erythematous.      Nose:      Right Turbinates: Enlarged, swollen and pale.      Left Turbinates: Swollen and pale.      Right Sinus: No maxillary sinus tenderness or frontal sinus tenderness.      Left Sinus: No maxillary sinus tenderness or frontal sinus tenderness.      Mouth/Throat:      Mouth: Mucous membranes are moist.      Pharynx: Uvula midline. Posterior oropharyngeal erythema present.      Tonsils: No tonsillar exudate.   Eyes:      General:         Right eye: No discharge.         Left eye: No discharge.      Conjunctiva/sclera: Conjunctivae normal.   Cardiovascular:      Rate and Rhythm: Normal rate and regular rhythm.      Heart sounds: Normal heart sounds.   Pulmonary:      Effort: Pulmonary effort is normal. No respiratory distress.      Breath sounds: Normal breath sounds. No wheezing, rhonchi or rales.   Chest:   Breasts:      Right: No supraclavicular adenopathy.       Left: No supraclavicular adenopathy.       Musculoskeletal:      Cervical back: Normal range of motion and neck supple.   Lymphadenopathy:      Cervical: No cervical adenopathy.      Upper Body:      Right upper body: No supraclavicular adenopathy.      Left upper body: No supraclavicular adenopathy.   Skin:     General: Skin is warm and dry.   Psychiatric:         Behavior: Behavior normal.          A/P    Problem List Items Addressed This Visit     None      Visit Diagnoses     Cough    -  Primary  -URI vs allergic rhinitis  -Add flonase and continue claritin. Use coricidin HBP for cough otc prn  -Discussed supportive measures.  -Order COVID swab to r/o. Counseled patient to quarantine at home until results return      Relevant Orders    COVID-19 PCR, [facility], NP SWAB IN [facility]/ORAL SWISH 24-30 HR TAT - Swab, Nasopharynx    Seasonal allergic rhinitis, unspecified trigger        Elevated blood pressure reading      -Recommended home monitoring, follow-up in 1 month          Plan of care was reviewed with patient at the conclusion of today's visit. Education was provided regarding diagnoses, management, prescribed or recommended OTC products, and the importance of compliance with follow-up appointments. The patient was counseled regarding the risks, benefits, and possible side-effects of treatment. I advised the patient to keep me informed of any acute changes in their status including new, worsening, or persistent symptoms. Patient expresses understanding and agreement with the management plan.        HEVER Sma

## 2021-10-13 ENCOUNTER — TELEPHONE (OUTPATIENT)
Dept: FAMILY MEDICINE CLINIC | Facility: CLINIC | Age: 46
End: 2021-10-13

## 2021-10-13 LAB — SARS-COV-2 RNA NOSE QL NAA+PROBE: NOT DETECTED

## 2021-10-13 NOTE — TELEPHONE ENCOUNTER
Contacted patient and relayed covid results. He states he feels much better after using prescribed meds and has no further questions

## 2021-10-13 NOTE — TELEPHONE ENCOUNTER
Caller: Raquel Fofana    Relationship: Emergency Contact    Best call back number: [phone]    Caller requesting test results: RAQUEL    What test was performed: COVID TEST    When was the test performed: 10/12/21    Where was the test performed: IN OFFICE     Additional notes: RAQUEL IS CALLING FOR RESULTS

## 2021-10-22 ENCOUNTER — TELEPHONE (OUTPATIENT)
Dept: FAMILY MEDICINE CLINIC | Facility: CLINIC | Age: 46
End: 2021-10-22

## 2021-10-22 DIAGNOSIS — J01.90 ACUTE NON-RECURRENT SINUSITIS, UNSPECIFIED LOCATION: Primary | ICD-10-CM

## 2021-10-22 RX ORDER — AMOXICILLIN AND CLAVULANATE POTASSIUM 875; 125 MG/1; MG/1
1 TABLET, FILM COATED ORAL 2 TIMES DAILY
Qty: 10 TABLET | Refills: 0 | Status: SHIPPED | OUTPATIENT
Start: 2021-10-22 | End: 2022-07-25

## 2021-10-22 NOTE — TELEPHONE ENCOUNTER
Caller: Alex Fofana    Relationship: Self    Best call back number: [phone]    What medication are you requesting: ANTIBIOTIC     What are your current symptoms: NASAL DISCHARGE GETTING WORSE SINCE APPOINTMENT ON 10/12    How long have you been experiencing symptoms: 1 WEEK    Have you had these symptoms before:    [x] Yes  [] No    Have you been treated for these symptoms before:   [x] Yes  [] No    If a prescription is needed, what is your preferred pharmacy and phone number:      [facility] [address] [facility] [phone] [facility] [phone] FX        Additional notes:

## 2021-10-22 NOTE — TELEPHONE ENCOUNTER
Please let patient know I've sent an antibiotic, Augmentin 875mg BID for 5 days to pharmacy. He should take this with food to avoid upset stomach. Please let us know if symptoms do not improve.

## 2021-10-22 NOTE — TELEPHONE ENCOUNTER
Contacted patient, he states that he is improving but he reports that PCP discused antibiotics  if he continues to experience cough, congestion and greenish mucus.     I advised him that his PCP is currently out of the office and I would check with another provider on staff.

## 2022-07-25 ENCOUNTER — LAB (OUTPATIENT)
Dept: LAB | Facility: HOSPITAL | Age: 47
End: 2022-07-25

## 2022-07-25 ENCOUNTER — OFFICE VISIT (OUTPATIENT)
Dept: FAMILY MEDICINE CLINIC | Facility: CLINIC | Age: 47
End: 2022-07-25

## 2022-07-25 VITALS
HEIGHT: 72 IN | BODY MASS INDEX: 24.92 KG/M2 | SYSTOLIC BLOOD PRESSURE: 165 MMHG | OXYGEN SATURATION: 96 % | DIASTOLIC BLOOD PRESSURE: 100 MMHG | TEMPERATURE: 97.1 F | HEART RATE: 81 BPM | WEIGHT: 184 LBS

## 2022-07-25 DIAGNOSIS — I10 HYPERTENSION, UNSPECIFIED TYPE: ICD-10-CM

## 2022-07-25 DIAGNOSIS — J02.9 PHARYNGITIS, UNSPECIFIED ETIOLOGY: Primary | ICD-10-CM

## 2022-07-25 DIAGNOSIS — R07.89 CHEST TIGHTNESS: ICD-10-CM

## 2022-07-25 LAB
EXPIRATION DATE: NORMAL
EXPIRATION DATE: NORMAL
FLUAV AG UPPER RESP QL IA.RAPID: NOT DETECTED
FLUBV AG UPPER RESP QL IA.RAPID: NOT DETECTED
INTERNAL CONTROL: NORMAL
INTERNAL CONTROL: NORMAL
Lab: NORMAL
Lab: NORMAL
S PYO AG THROAT QL: NEGATIVE
SARS-COV-2 AG UPPER RESP QL IA.RAPID: NOT DETECTED
TROPONIN T SERPL-MCNC: <0.01 NG/ML (ref 0–0.03)

## 2022-07-25 PROCEDURE — 87428 SARSCOV & INF VIR A&B AG IA: CPT | Performed by: PHYSICIAN ASSISTANT

## 2022-07-25 PROCEDURE — 99214 OFFICE O/P EST MOD 30 MIN: CPT | Performed by: PHYSICIAN ASSISTANT

## 2022-07-25 PROCEDURE — 93000 ELECTROCARDIOGRAM COMPLETE: CPT | Performed by: PHYSICIAN ASSISTANT

## 2022-07-25 PROCEDURE — 84484 ASSAY OF TROPONIN QUANT: CPT

## 2022-07-25 PROCEDURE — 87880 STREP A ASSAY W/OPTIC: CPT | Performed by: PHYSICIAN ASSISTANT

## 2022-07-25 RX ORDER — LISINOPRIL 10 MG/1
10 TABLET ORAL DAILY
Qty: 30 TABLET | Refills: 0 | Status: SHIPPED | OUTPATIENT
Start: 2022-07-25

## 2022-07-25 NOTE — PROGRESS NOTES
"    Chief Complaint   Patient presents with   • Sore Throat     X 1 day    • URI     X 1 day       HPI     Alex Fofana is a pleasant 46 y.o. male who presents for evaluation of \"chief complaint.\"     Patient was last seen in October '21. He cancelled 2 appointments for follow-up of elevated blood pressure since then.     Her today c/o scratchy throat starting last night after working outside and cutting grass all day. He also mentions chest tightness when he woke up this morning. He did not have any exertional symptoms with yard work yesterday. He admits to some fatigue but did not sleep well last night. Denies fever, body aches, shortness of breath, cough, rhinorrhea, congestion. There is no personal history of DVT/PE. He has a strong family history of hypertension. He has been treated with antihypertensives in the past but changed his diet and was able to come off medication.     Past Medical History:   Diagnosis Date   • GERD (gastroesophageal reflux disease)    • Hypertension    • Migraines        History reviewed. No pertinent surgical history.    Family History   Problem Relation Age of Onset   • Diabetes Mother    • Heart attack Mother    • Hypertension Mother    • Kidney disease Mother    • Arthritis Father    • Pancreatic cancer Sister    • Hypertension Son    • Breast cancer Maternal Aunt        Social History     Socioeconomic History   • Marital status:    Tobacco Use   • Smoking status: Never Smoker   • Smokeless tobacco: Never Used   Substance and Sexual Activity   • Alcohol use: Not Currently   • Drug use: Not Currently       Allergies   Allergen Reactions   • Erythromycin        ROS    Review of Systems   Constitutional: Positive for fatigue. Negative for fever.   HENT: Positive for sore throat. Negative for congestion.    Eyes: Negative for double vision.   Respiratory: Positive for chest tightness. Negative for cough, shortness of breath and wheezing.    Cardiovascular: Negative for " chest pain.   Gastrointestinal: Negative for GERD.   Musculoskeletal: Negative for myalgias.   Neurological: Negative for headache.       Vitals:    07/25/22 1430   BP: 165/100   Pulse: 81   Temp: 97.1 °F (36.2 °C)   SpO2: 96%     Body mass index is 24.95 kg/m².      Current Outpatient Medications:   •  loratadine (CLARITIN) 10 MG tablet, Take 10 mg by mouth Daily., Disp: , Rfl:   •  fluticasone (Flonase) 50 MCG/ACT nasal spray, 2 sprays into the nostril(s) as directed by provider Daily., Disp: 18.2 mL, Rfl: 2  •  lisinopril (PRINIVIL,ZESTRIL) 10 MG tablet, Take 1 tablet by mouth Daily., Disp: 30 tablet, Rfl: 0    PE    Physical Exam  Vitals reviewed.   Constitutional:       General: He is not in acute distress.     Appearance: He is well-developed.   HENT:      Head: Normocephalic and atraumatic.      Right Ear: Tympanic membrane normal.      Left Ear: Tympanic membrane normal.      Nose:      Right Sinus: No maxillary sinus tenderness or frontal sinus tenderness.      Left Sinus: No maxillary sinus tenderness or frontal sinus tenderness.      Mouth/Throat:      Mouth: Mucous membranes are moist.      Pharynx: Oropharynx is clear. No posterior oropharyngeal erythema.      Tonsils: No tonsillar exudate.   Eyes:      Conjunctiva/sclera: Conjunctivae normal.   Cardiovascular:      Rate and Rhythm: Normal rate and regular rhythm.      Heart sounds: Normal heart sounds. No murmur heard.  Pulmonary:      Effort: Pulmonary effort is normal.      Breath sounds: Normal breath sounds.   Musculoskeletal:      Cervical back: Normal range of motion.   Skin:     General: Skin is warm and dry.   Neurological:      Mental Status: He is alert.      Gait: Gait normal.   Psychiatric:         Speech: Speech normal.         Behavior: Behavior normal.         ECG 12 Lead    Date/Time: 7/25/2022 2:49 PM  Performed by: Soy Gomez PA  Authorized by: Soy Gomez PA   Comparison: not compared with previous ECG   Previous ECG: no  previous ECG available  Rhythm: sinus rhythm and sinus arrhythmia  Rate: normal  BPM: 68  Other findings: non-specific ST-T wave changes    Clinical impression: non-specific ECG  Comments: Possible right atrial enlargement (0.25 mV P wave)          A/P    Problem List Items Addressed This Visit    None     Visit Diagnoses     Pharyngitis, unspecified etiology    -  Primary  -Rapid tests for influenza, COVID, and strep are negative  -Discussed likely viral or allergic etiology  -He will resume an antihistamine daily to see if this will help    Relevant Orders    POCT rapid strep A    POCT SARS-CoV-2 Antigen STEFANIA     Hypertension, unspecified type      -BP for me 144/94 on repeat  -Start lisinopril 10 mg daily    Relevant Medications    lisinopril (PRINIVIL,ZESTRIL) 10 MG tablet    Chest tightness      -EKG today is not consistent with acute ischemia. Check troponin  -?related to acute URI  -RTC in 2 weeks for follow-up of hypertension and chest pressure    Relevant Orders    Troponin     ECG 12 Lead          Plan of care was reviewed with patient at the conclusion of today's visit. Education was provided regarding diagnoses, management, prescribed or recommended OTC products, and the importance of compliance with follow-up appointments. The patient was counseled regarding the risks, benefits, and possible side-effects of treatment. I advised the patient to keep me informed of any acute changes in their status including new, worsening, or persistent symptoms. Patient expresses understanding and agreement with the management plan.        HEVER Sam

## 2022-07-26 ENCOUNTER — TELEPHONE (OUTPATIENT)
Dept: FAMILY MEDICINE CLINIC | Facility: CLINIC | Age: 47
End: 2022-07-26

## 2022-07-26 NOTE — TELEPHONE ENCOUNTER
Attempted to contact, no answer. Left detailed voicemail relaying provider's message     Please let the patient know his labs show no acute heart muscle injury which is reassuring. Let me know if he has questions.   Hub can relay and document.

## 2022-07-26 NOTE — TELEPHONE ENCOUNTER
Attempted to contact, no answer. Letter has been sent     Please let the patient know his labs show no acute heart muscle injury which is reassuring. Let me know if he has questions.   Hub can relay and document.

## 2023-05-01 ENCOUNTER — OFFICE VISIT (OUTPATIENT)
Dept: FAMILY MEDICINE CLINIC | Facility: CLINIC | Age: 48
End: 2023-05-01
Payer: COMMERCIAL

## 2023-05-01 VITALS
HEART RATE: 70 BPM | HEIGHT: 72 IN | DIASTOLIC BLOOD PRESSURE: 120 MMHG | BODY MASS INDEX: 24 KG/M2 | OXYGEN SATURATION: 99 % | TEMPERATURE: 97.5 F | WEIGHT: 177.2 LBS | SYSTOLIC BLOOD PRESSURE: 160 MMHG

## 2023-05-01 DIAGNOSIS — R31.9 HEMATURIA, UNSPECIFIED TYPE: ICD-10-CM

## 2023-05-01 DIAGNOSIS — M54.50 ACUTE LEFT-SIDED LOW BACK PAIN WITHOUT SCIATICA: Primary | ICD-10-CM

## 2023-05-01 DIAGNOSIS — I10 HYPERTENSION, UNSPECIFIED TYPE: ICD-10-CM

## 2023-05-01 LAB
BILIRUB BLD-MCNC: NEGATIVE MG/DL
CLARITY, POC: CLEAR
COLOR UR: YELLOW
EXPIRATION DATE: ABNORMAL
GLUCOSE UR STRIP-MCNC: NEGATIVE MG/DL
KETONES UR QL: NEGATIVE
LEUKOCYTE EST, POC: NEGATIVE
Lab: ABNORMAL
NITRITE UR-MCNC: NEGATIVE MG/ML
PH UR: 7.5 [PH] (ref 5–8)
PROT UR STRIP-MCNC: NEGATIVE MG/DL
RBC # UR STRIP: ABNORMAL /UL
SP GR UR: 1.01 (ref 1–1.03)
UROBILINOGEN UR QL: NORMAL

## 2023-05-01 PROCEDURE — 81015 MICROSCOPIC EXAM OF URINE: CPT | Performed by: PHYSICIAN ASSISTANT

## 2023-05-01 RX ORDER — METHOCARBAMOL 500 MG/1
500 TABLET, FILM COATED ORAL 3 TIMES DAILY PRN
Qty: 30 TABLET | Refills: 0 | Status: SHIPPED | OUTPATIENT
Start: 2023-05-01

## 2023-05-01 RX ORDER — AMLODIPINE BESYLATE 5 MG/1
5 TABLET ORAL DAILY
Qty: 30 TABLET | Refills: 0 | Status: SHIPPED | OUTPATIENT
Start: 2023-05-01

## 2023-05-01 NOTE — ASSESSMENT & PLAN NOTE
Untreated.  Discussed risks of untreated hypertension including renal failure, MI, stroke.  Positive family history of hypertension.  Patient is agreeable to starting amlodipine 5 mg daily after discussion.  Close follow-up in 2 weeks for blood pressure check.  Plan on checking labs at that time.

## 2023-05-01 NOTE — PROGRESS NOTES
"    Chief Complaint   Patient presents with   • Pain     By spine on left side  No injury  Hurts more when sitting  Sometimes feels like a pop or grind and it hurts worse       HPI     Alex Fofana is a pleasant 47 y.o. male who presents for evaluation of \"chief complaint.\"     The patient was last seen in July. Since that time he has canceled 3 appointments here.  He presents today c/o intermittent, left lower back pain for 2 weeks. At times he feels popping or grinding when he feels the pain. He denies recent injury/trauma. Standing and changing positions along with rest seems to help. Denies fever, chills, night sweats, hematuria, radiculopathy, leg paresthesia/weakness, bladder/bowel dysfunction. He took Aleve one day that seemed to help.     At his last visit, he was started on lisinopril 10 mg daily.  He is not taking this medication and states his blood pressure at home has been much better since improving his diet recently.  He does not have any specific measurements for review or his home blood pressure cuff with him today.  He denies chest pain, shortness of breath, headaches, dizziness, and vision changes.  He does admit to a strong family history of hypertension.    Past Medical History:   Diagnosis Date   • GERD (gastroesophageal reflux disease)    • Hypertension    • Migraines        History reviewed. No pertinent surgical history.    Family History   Problem Relation Age of Onset   • Diabetes Mother    • Heart attack Mother    • Hypertension Mother    • Kidney disease Mother    • Arthritis Father    • Pancreatic cancer Sister    • Hypertension Son    • Breast cancer Maternal Aunt        Social History     Socioeconomic History   • Marital status:    Tobacco Use   • Smoking status: Never   • Smokeless tobacco: Never   Substance and Sexual Activity   • Alcohol use: Not Currently   • Drug use: Not Currently       Allergies   Allergen Reactions   • Erythromycin        ROS    Review of Systems "   Eyes: Negative for blurred vision.   Respiratory: Negative for shortness of breath.    Cardiovascular: Negative for chest pain.   Musculoskeletal: Positive for arthralgias and back pain.   Neurological: Negative for dizziness and headache.       Vitals:    05/01/23 1035   BP: (!) 160/120   Pulse: 70   Temp: 97.5 °F (36.4 °C)   SpO2: 99%     Body mass index is 24.03 kg/m².      Current Outpatient Medications:   •  loratadine (CLARITIN) 10 MG tablet, Take 1 tablet by mouth Daily., Disp: , Rfl:   •  amLODIPine (NORVASC) 5 MG tablet, Take 1 tablet by mouth Daily., Disp: 30 tablet, Rfl: 0  •  methocarbamol (ROBAXIN) 500 MG tablet, Take 1 tablet by mouth 3 (Three) Times a Day As Needed for Muscle Spasms., Disp: 30 tablet, Rfl: 0    PE    Physical Exam  Vitals reviewed.   Constitutional:       General: He is not in acute distress.     Appearance: He is well-developed.   HENT:      Head: Normocephalic and atraumatic.   Eyes:      Conjunctiva/sclera: Conjunctivae normal.   Cardiovascular:      Rate and Rhythm: Normal rate and regular rhythm.      Heart sounds: Normal heart sounds. No murmur heard.  Pulmonary:      Effort: Pulmonary effort is normal.      Breath sounds: Normal breath sounds.   Musculoskeletal:      Cervical back: Normal range of motion.      Lumbar back: No tenderness or bony tenderness.        Back:       Right hip: Normal. No tenderness. Normal range of motion.      Left hip: Normal. No tenderness. Normal range of motion.   Skin:     General: Skin is warm and dry.   Neurological:      Mental Status: He is alert.      Gait: Gait normal.      Deep Tendon Reflexes:      Reflex Scores:       Patellar reflexes are 2+ on the right side and 2+ on the left side.       Achilles reflexes are 2+ on the right side and 2+ on the left side.     Comments: BLE strength 5/5. Bilateral SLR negative.    Psychiatric:         Speech: Speech normal.         Behavior: Behavior normal.          A/P    Problem List Items  Addressed This Visit        Cardiac and Vasculature    Hypertension    Current Assessment & Plan     Untreated.  Discussed risks of untreated hypertension including renal failure, MI, stroke.  Positive family history of hypertension.  Patient is agreeable to starting amlodipine 5 mg daily after discussion.  Close follow-up in 2 weeks for blood pressure check.  Plan on checking labs at that time.         Relevant Medications    amLODIPine (NORVASC) 5 MG tablet   Other Visit Diagnoses     Acute left-sided low back pain without sciatica    -  Primary    Discussed likely musculoskeletal etiology, management with Robaxin, rest, and physical therapy.  If pain persists/worsens, consider imaging.    Relevant Orders    POCT urinalysis dipstick, automated (Completed)    Ambulatory Referral to Physical Therapy Evaluate and treat    Hematuria, unspecified type        Trace blood on dipstick urinalysis in office.  We will order UA with microscopy to confirm.  There is no history of kidney stones.    Relevant Orders    Urinalysis, Microscopic Only - Urine, Clean Catch          Plan of care was reviewed with patient at the conclusion of today's visit. Education was provided regarding diagnoses, management, prescribed or recommended OTC products, and the importance of compliance with follow-up appointments. The patient was counseled regarding the risks, benefits, and possible side-effects of treatment. I advised the patient to keep me informed of any acute changes in their status including new, worsening, or persistent symptoms. Patient expresses understanding and agreement with the management plan.        HEVER Sam

## 2023-05-02 LAB
BACTERIA UR QL AUTO: NORMAL /HPF
HYALINE CASTS UR QL AUTO: NORMAL /LPF
RBC # UR STRIP: NORMAL /HPF
REF LAB TEST METHOD: NORMAL
SQUAMOUS #/AREA URNS HPF: NORMAL /HPF
WBC # UR STRIP: NORMAL /HPF

## 2023-09-20 NOTE — TELEPHONE ENCOUNTER
-History of ADHD and Adderall use in the past  -Would not recommend restarting stimulant at this time given blood pressure elevation   Sent to pharm in [place].

## 2023-10-21 ENCOUNTER — APPOINTMENT (OUTPATIENT)
Dept: CT IMAGING | Facility: HOSPITAL | Age: 48
End: 2023-10-21
Payer: COMMERCIAL

## 2023-10-21 ENCOUNTER — HOSPITAL ENCOUNTER (EMERGENCY)
Facility: HOSPITAL | Age: 48
Discharge: HOME | End: 2023-10-21
Attending: STUDENT IN AN ORGANIZED HEALTH CARE EDUCATION/TRAINING PROGRAM
Payer: COMMERCIAL

## 2023-10-21 VITALS
TEMPERATURE: 97.5 F | OXYGEN SATURATION: 100 % | DIASTOLIC BLOOD PRESSURE: 127 MMHG | SYSTOLIC BLOOD PRESSURE: 190 MMHG | HEIGHT: 72 IN | BODY MASS INDEX: 24.38 KG/M2 | WEIGHT: 180 LBS | HEART RATE: 96 BPM | RESPIRATION RATE: 16 BRPM

## 2023-10-21 DIAGNOSIS — I10 HYPERTENSION, UNSPECIFIED TYPE: ICD-10-CM

## 2023-10-21 DIAGNOSIS — R10.9 FLANK PAIN: Primary | ICD-10-CM

## 2023-10-21 DIAGNOSIS — K59.00 CONSTIPATION, UNSPECIFIED CONSTIPATION TYPE: ICD-10-CM

## 2023-10-21 LAB
ALBUMIN SERPL-MCNC: 4.6 G/DL (ref 3.5–5.2)
ALBUMIN/GLOB SERPL: 1.6 G/DL
ALP SERPL-CCNC: 60 U/L (ref 39–117)
ALT SERPL W P-5'-P-CCNC: 19 U/L (ref 1–41)
ANION GAP SERPL CALCULATED.3IONS-SCNC: 9 MMOL/L (ref 5–15)
AST SERPL-CCNC: 23 U/L (ref 1–40)
BASOPHILS # BLD AUTO: 0.03 10*3/MM3 (ref 0–0.2)
BASOPHILS NFR BLD AUTO: 0.4 % (ref 0–1.5)
BILIRUB SERPL-MCNC: 0.6 MG/DL (ref 0–1.2)
BILIRUB UR QL STRIP: NEGATIVE
BUN SERPL-MCNC: 10 MG/DL (ref 6–20)
BUN/CREAT SERPL: 11.9 (ref 7–25)
CALCIUM SPEC-SCNC: 9.6 MG/DL (ref 8.6–10.5)
CHLORIDE SERPL-SCNC: 103 MMOL/L (ref 98–107)
CLARITY UR: CLEAR
CO2 SERPL-SCNC: 29 MMOL/L (ref 22–29)
COLOR UR: YELLOW
CREAT SERPL-MCNC: 0.84 MG/DL (ref 0.76–1.27)
D-LACTATE SERPL-SCNC: 0.8 MMOL/L (ref 0.5–2)
DEPRECATED RDW RBC AUTO: 39.8 FL (ref 37–54)
EGFRCR SERPLBLD CKD-EPI 2021: 108.9 ML/MIN/1.73
EOSINOPHIL # BLD AUTO: 0.05 10*3/MM3 (ref 0–0.4)
EOSINOPHIL NFR BLD AUTO: 0.7 % (ref 0.3–6.2)
ERYTHROCYTE [DISTWIDTH] IN BLOOD BY AUTOMATED COUNT: 11.7 % (ref 12.3–15.4)
GLOBULIN UR ELPH-MCNC: 2.9 GM/DL
GLUCOSE SERPL-MCNC: 98 MG/DL (ref 65–99)
GLUCOSE UR STRIP-MCNC: NEGATIVE MG/DL
HCT VFR BLD AUTO: 47.2 % (ref 37.5–51)
HGB BLD-MCNC: 16 G/DL (ref 13–17.7)
HGB UR QL STRIP.AUTO: NEGATIVE
IMM GRANULOCYTES # BLD AUTO: 0.02 10*3/MM3 (ref 0–0.05)
IMM GRANULOCYTES NFR BLD AUTO: 0.3 % (ref 0–0.5)
KETONES UR QL STRIP: NEGATIVE
LEUKOCYTE ESTERASE UR QL STRIP.AUTO: NEGATIVE
LIPASE SERPL-CCNC: 26 U/L (ref 13–60)
LYMPHOCYTES # BLD AUTO: 1.78 10*3/MM3 (ref 0.7–3.1)
LYMPHOCYTES NFR BLD AUTO: 24.1 % (ref 19.6–45.3)
MAGNESIUM SERPL-MCNC: 1.8 MG/DL (ref 1.6–2.6)
MCH RBC QN AUTO: 31.6 PG (ref 26.6–33)
MCHC RBC AUTO-ENTMCNC: 33.9 G/DL (ref 31.5–35.7)
MCV RBC AUTO: 93.3 FL (ref 79–97)
MONOCYTES # BLD AUTO: 0.63 10*3/MM3 (ref 0.1–0.9)
MONOCYTES NFR BLD AUTO: 8.5 % (ref 5–12)
NEUTROPHILS NFR BLD AUTO: 4.88 10*3/MM3 (ref 1.7–7)
NEUTROPHILS NFR BLD AUTO: 66 % (ref 42.7–76)
NITRITE UR QL STRIP: NEGATIVE
NRBC BLD AUTO-RTO: 0 /100 WBC (ref 0–0.2)
PH UR STRIP.AUTO: 7 [PH] (ref 5–8)
PLATELET # BLD AUTO: 219 10*3/MM3 (ref 140–450)
PMV BLD AUTO: 9.2 FL (ref 6–12)
POTASSIUM SERPL-SCNC: 4.2 MMOL/L (ref 3.5–5.2)
PROT SERPL-MCNC: 7.5 G/DL (ref 6–8.5)
PROT UR QL STRIP: NEGATIVE
RBC # BLD AUTO: 5.06 10*6/MM3 (ref 4.14–5.8)
SODIUM SERPL-SCNC: 141 MMOL/L (ref 136–145)
SP GR UR STRIP: <=1.005 (ref 1–1.03)
UROBILINOGEN UR QL STRIP: NORMAL
WBC NRBC COR # BLD: 7.39 10*3/MM3 (ref 3.4–10.8)

## 2023-10-21 PROCEDURE — 25810000003 SODIUM CHLORIDE 0.9 % SOLUTION: Performed by: STUDENT IN AN ORGANIZED HEALTH CARE EDUCATION/TRAINING PROGRAM

## 2023-10-21 PROCEDURE — 83735 ASSAY OF MAGNESIUM: CPT | Performed by: STUDENT IN AN ORGANIZED HEALTH CARE EDUCATION/TRAINING PROGRAM

## 2023-10-21 PROCEDURE — 96361 HYDRATE IV INFUSION ADD-ON: CPT

## 2023-10-21 PROCEDURE — 81003 URINALYSIS AUTO W/O SCOPE: CPT | Performed by: STUDENT IN AN ORGANIZED HEALTH CARE EDUCATION/TRAINING PROGRAM

## 2023-10-21 PROCEDURE — 25010000002 ONDANSETRON PER 1 MG: Performed by: STUDENT IN AN ORGANIZED HEALTH CARE EDUCATION/TRAINING PROGRAM

## 2023-10-21 PROCEDURE — 99284 EMERGENCY DEPT VISIT MOD MDM: CPT

## 2023-10-21 PROCEDURE — 25010000002 KETOROLAC TROMETHAMINE PER 15 MG: Performed by: STUDENT IN AN ORGANIZED HEALTH CARE EDUCATION/TRAINING PROGRAM

## 2023-10-21 PROCEDURE — 74176 CT ABD & PELVIS W/O CONTRAST: CPT

## 2023-10-21 PROCEDURE — 80053 COMPREHEN METABOLIC PANEL: CPT | Performed by: STUDENT IN AN ORGANIZED HEALTH CARE EDUCATION/TRAINING PROGRAM

## 2023-10-21 PROCEDURE — 83690 ASSAY OF LIPASE: CPT | Performed by: STUDENT IN AN ORGANIZED HEALTH CARE EDUCATION/TRAINING PROGRAM

## 2023-10-21 PROCEDURE — 96375 TX/PRO/DX INJ NEW DRUG ADDON: CPT

## 2023-10-21 PROCEDURE — 96374 THER/PROPH/DIAG INJ IV PUSH: CPT

## 2023-10-21 PROCEDURE — 85025 COMPLETE CBC W/AUTO DIFF WBC: CPT | Performed by: STUDENT IN AN ORGANIZED HEALTH CARE EDUCATION/TRAINING PROGRAM

## 2023-10-21 PROCEDURE — 83605 ASSAY OF LACTIC ACID: CPT | Performed by: STUDENT IN AN ORGANIZED HEALTH CARE EDUCATION/TRAINING PROGRAM

## 2023-10-21 RX ORDER — METHOCARBAMOL 750 MG/1
750 TABLET, FILM COATED ORAL ONCE
Status: COMPLETED | OUTPATIENT
Start: 2023-10-21 | End: 2023-10-21

## 2023-10-21 RX ORDER — ONDANSETRON 2 MG/ML
4 INJECTION INTRAMUSCULAR; INTRAVENOUS ONCE
Status: COMPLETED | OUTPATIENT
Start: 2023-10-21 | End: 2023-10-21

## 2023-10-21 RX ORDER — KETOROLAC TROMETHAMINE 15 MG/ML
15 INJECTION, SOLUTION INTRAMUSCULAR; INTRAVENOUS ONCE
Status: COMPLETED | OUTPATIENT
Start: 2023-10-21 | End: 2023-10-21

## 2023-10-21 RX ORDER — POLYETHYLENE GLYCOL 3350 17 G/17G
17 POWDER, FOR SOLUTION ORAL DAILY
Qty: 100 EACH | Refills: 0 | Status: SHIPPED | OUTPATIENT
Start: 2023-10-21

## 2023-10-21 RX ORDER — LABETALOL HYDROCHLORIDE 5 MG/ML
10 INJECTION, SOLUTION INTRAVENOUS ONCE
Status: COMPLETED | OUTPATIENT
Start: 2023-10-21 | End: 2023-10-21

## 2023-10-21 RX ADMIN — ONDANSETRON 4 MG: 2 INJECTION INTRAMUSCULAR; INTRAVENOUS at 13:42

## 2023-10-21 RX ADMIN — KETOROLAC TROMETHAMINE 15 MG: 15 INJECTION, SOLUTION INTRAMUSCULAR; INTRAVENOUS at 13:42

## 2023-10-21 RX ADMIN — Medication 10 MG: at 14:42

## 2023-10-21 RX ADMIN — SODIUM CHLORIDE 1000 ML: 9 INJECTION, SOLUTION INTRAVENOUS at 13:42

## 2023-10-21 RX ADMIN — METHOCARBAMOL 750 MG: 750 TABLET ORAL at 13:43

## 2023-10-21 NOTE — DISCHARGE INSTRUCTIONS
Try the provided bowel regimen which should hopefully help with symptoms.  Follow-up with your PCP.  While today's work-up was reassuring if your symptoms change or worsen please return to the ED or seek other medical care.

## 2023-10-21 NOTE — ED PROVIDER NOTES
EMERGENCY DEPARTMENT ENCOUNTER    Pt Name: Alex Fofana  MRN: [id number]  Pt :   1975  Room Number:    Date of encounter:  10/21/2023  PCP: Soy Gomez PA  ED Provider: Elliot Waterman MD    Historian: Patient      HPI:  Chief Complaint: Flank pain        Context: Alex Fofana is a 47-year-old male with history of hypertension who presents because of just under 1 week of left flank pain.  He feels that in his mid to lower left back.  It does sometimes gets worse with movement.  The pain does come in waves.  He has not appreciated any dysuria or diarrhea.  No nausea or vomiting.  He has not tried any medication for this.  He has also intermittently had right lower quadrant pain and feels like he can feel a mass thinks it may be a hernia.  No other complaints at this time.     PAST MEDICAL HISTORY  Past Medical History:   Diagnosis Date    GERD (gastroesophageal reflux disease)     Hypertension     Migraines          PAST SURGICAL HISTORY  No past surgical history on file.      FAMILY HISTORY  Family History   Problem Relation Age of Onset    Diabetes Mother     Heart attack Mother     Hypertension Mother     Kidney disease Mother     Arthritis Father     Pancreatic cancer Sister     Hypertension Son     Breast cancer Maternal Aunt          SOCIAL HISTORY  Social History     Socioeconomic History    Marital status:    Tobacco Use    Smoking status: Never    Smokeless tobacco: Never   Substance and Sexual Activity    Alcohol use: Not Currently    Drug use: Not Currently         ALLERGIES  Erythromycin        REVIEW OF SYSTEMS  Review of Systems       All systems reviewed and negative except for those discussed in HPI.       PHYSICAL EXAM    I have reviewed the triage vital signs and nursing notes.    ED Triage Vitals [10/21/23 1242]   Temp Heart Rate Resp BP SpO2   97.5 °F (36.4 °C) 72 16 (!) 193/115 98 %      Temp src Heart Rate Source Patient Position BP Location FiO2 (%)    Oral Monitor -- -- --       Physical Exam  GENERAL:   Appears in no acute distress.   HENT: Nares patent.  EYES: No scleral icterus.  CV: Regular rhythm, regular rate.  RESPIRATORY: Normal effort.  No audible wheezes, rales or rhonchi.  ABDOMEN: Soft, nontender, no CVA tenderness, has a small inguinal lymph node that is mobile and nontender.  MUSCULOSKELETAL: No deformities.   NEURO: Alert, moves all extremities, follows commands.  SKIN: Warm, dry, no rash visualized.      LAB RESULTS  Recent Results (from the past 24 hour(s))   Urinalysis With Microscopic If Indicated (No Culture) - Urine, Clean Catch    Collection Time: 10/21/23  1:32 PM    Specimen: Urine, Clean Catch   Result Value Ref Range    Color, UA Yellow Yellow, Straw    Appearance, UA Clear Clear    pH, UA 7.0 5.0 - 8.0    Specific Gravity, UA <=1.005 1.001 - 1.030    Glucose, UA Negative Negative    Ketones, UA Negative Negative    Bilirubin, UA Negative Negative    Blood, UA Negative Negative    Protein, UA Negative Negative    Leuk Esterase, UA Negative Negative    Nitrite, UA Negative Negative    Urobilinogen, UA 0.2 E.U./dL 0.2 - 1.0 E.U./dL   Lactic Acid, Plasma    Collection Time: 10/21/23  1:43 PM    Specimen: Blood   Result Value Ref Range    Lactate 0.8 0.5 - 2.0 mmol/L   CBC Auto Differential    Collection Time: 10/21/23  1:43 PM    Specimen: Blood   Result Value Ref Range    WBC 7.39 3.40 - 10.80 10*3/mm3    RBC 5.06 4.14 - 5.80 10*6/mm3    Hemoglobin 16.0 13.0 - 17.7 g/dL    Hematocrit 47.2 37.5 - 51.0 %    MCV 93.3 79.0 - 97.0 fL    MCH 31.6 26.6 - 33.0 pg    MCHC 33.9 31.5 - 35.7 g/dL    RDW 11.7 (L) 12.3 - 15.4 %    RDW-SD 39.8 37.0 - 54.0 fl    MPV 9.2 6.0 - 12.0 fL    Platelets 219 140 - 450 10*3/mm3    Neutrophil % 66.0 42.7 - 76.0 %    Lymphocyte % 24.1 19.6 - 45.3 %    Monocyte % 8.5 5.0 - 12.0 %    Eosinophil % 0.7 0.3 - 6.2 %    Basophil % 0.4 0.0 - 1.5 %    Immature Grans % 0.3 0.0 - 0.5 %    Neutrophils, Absolute 4.88 1.70 -  7.00 10*3/mm3    Lymphocytes, Absolute 1.78 0.70 - 3.10 10*3/mm3    Monocytes, Absolute 0.63 0.10 - 0.90 10*3/mm3    Eosinophils, Absolute 0.05 0.00 - 0.40 10*3/mm3    Basophils, Absolute 0.03 0.00 - 0.20 10*3/mm3    Immature Grans, Absolute 0.02 0.00 - 0.05 10*3/mm3    nRBC 0.0 0.0 - 0.2 /100 WBC   Comprehensive Metabolic Panel    Collection Time: 10/21/23  2:17 PM    Specimen: Arm, Left; Blood   Result Value Ref Range    Glucose 98 65 - 99 mg/dL    BUN 10 6 - 20 mg/dL    Creatinine 0.84 0.76 - 1.27 mg/dL    Sodium 141 136 - 145 mmol/L    Potassium 4.2 3.5 - 5.2 mmol/L    Chloride 103 98 - 107 mmol/L    CO2 29.0 22.0 - 29.0 mmol/L    Calcium 9.6 8.6 - 10.5 mg/dL    Total Protein 7.5 6.0 - 8.5 g/dL    Albumin 4.6 3.5 - 5.2 g/dL    ALT (SGPT) 19 1 - 41 U/L    AST (SGOT) 23 1 - 40 U/L    Alkaline Phosphatase 60 39 - 117 U/L    Total Bilirubin 0.6 0.0 - 1.2 mg/dL    Globulin 2.9 gm/dL    A/G Ratio 1.6 g/dL    BUN/Creatinine Ratio 11.9 7.0 - 25.0    Anion Gap 9.0 5.0 - 15.0 mmol/L    eGFR 108.9 >60.0 mL/min/1.73   Lipase    Collection Time: 10/21/23  2:17 PM    Specimen: Arm, Left; Blood   Result Value Ref Range    Lipase 26 13 - 60 U/L   Magnesium    Collection Time: 10/21/23  2:17 PM    Specimen: Arm, Left; Blood   Result Value Ref Range    Magnesium 1.8 1.6 - 2.6 mg/dL       If labs were ordered, I independently reviewed the results and considered them in treating the patient.        RADIOLOGY  CT Abdomen Pelvis Without Contrast    Result Date: 10/21/2023  CT ABDOMEN PELVIS WO CONTRAST Date of Exam: 10/21/2023 2:00 PM EDT Indication: colicky left flank pain, intermittent RLQ pain (possible small hernia on exam).. Comparison: None available. Technique: Axial CT images were obtained of the abdomen and pelvis without the administration of contrast. Reconstructed coronal and sagittal images were also obtained. Automated exposure control and iterative construction methods were used. Findings: Liver: The liver is  unremarkable in morphology. Evaluation for focal liver lesions is limited without IV contrast. No biliary dilation is seen. Gallbladder: Unremarkable. Pancreas: Unremarkable. Spleen: Unremarkable. Adrenal glands: Unremarkable. Genitourinary tract: The kidneys appear unremarkable. No hydronephrosis is seen. No urinary tract calculi are seen. Ureters are not well visualized. Mild bladder wall thickening may be related to underdistention or cystitis. Gastrointestinal tract: Limited visualization of the hollow viscera due to lack of IV contrast administration. Moderate colonic stool is present. No findings to suggest bowel obstruction. Appendix: The appendix is not definitely identified. Other findings: No free air or free fluid is identified. No pathologically enlarged lymph nodes are seen. The abdominal aorta is unremarkable. Bones and soft tissues: No acute or suspicious osseous or soft tissue lesion is identified. Lung bases: The visualized lung bases are clear.     Impression: 1.No acute abnormality identified within the abdomen or pelvis. 2.No hydronephrosis or urinary tract calculi identified. 3.The appendix is not identified. 4.Moderate colonic stool. 5.Additional findings as detailed above. Electronically Signed: Jesus Reyes MD  10/21/2023 2:11 PM EDT  Workstation ID: [id number]     I ordered and independently reviewed the above noted radiographic studies.      I viewed images of CT scan of the abdomen pelvis which shows significant stool burden but no other acute pathology that I can appreciate.    See radiologist's dictation for official interpretation.        PROCEDURES    Procedures    No orders to display       MEDICATIONS GIVEN IN ER    Medications   sodium chloride 0.9 % bolus 1,000 mL (0 mL Intravenous Stopped 10/21/23 1556)   ondansetron (ZOFRAN) injection 4 mg (4 mg Intravenous Given 10/21/23 1342)   ketorolac (TORADOL) injection 15 mg (15 mg Intravenous Given 10/21/23 1342)   methocarbamol  (ROBAXIN) tablet 750 mg (750 mg Oral Given 10/21/23 1343)   labetalol (NORMODYNE,TRANDATE) injection 10 mg (10 mg Intravenous Given 10/21/23 1442)         MEDICAL DECISION MAKING, PROGRESS, and CONSULTS    All labs have been independently reviewed by me.  All radiology studies have been reviewed by me and the radiologist dictating the report.  All EKG's have been independently viewed and interpreted by me/my attending physician.      Discussion below represents my analysis of pertinent findings related to patient's condition, differential diagnosis, treatment plan and final disposition.      Differential diagnosis:    Kidney stone, pyelonephritis, urinary tract infection, hernia, colitis, bowel obstruction, volvulus, ileus, sepsis, anemia, electrolyte abnormality, hypertensive urgency, kidney injury      Additional sources:    - Discussed/ obtained information from independent historians:      - External (non-ED) record review: Multiple PCP notes with Kevin leonard for back pain, pharyngitis, cough    - Chronic or social conditions impacting care: Hypertension    - Shared decision making: Agreeable to discharge with close PCP follow-up      Orders placed during this visit:  Orders Placed This Encounter   Procedures    CT Abdomen Pelvis Without Contrast    Comprehensive Metabolic Panel    Lipase    Urinalysis With Microscopic If Indicated (No Culture) - Urine, Clean Catch    Lactic Acid, Plasma    Magnesium    CBC Auto Differential    CBC & Differential         Additional orders considered but not ordered:      ED Course:    Consultants:      ED Course as of 10/21/23 1659   Sat Oct 21, 2023   1320 In summary is a very nice 47-year-old male with history of hypertension who presents because of just under 1 week of left flank pain.  He feels that in his mid to lower left back.  It does sometimes gets worse with movement.  The pain does come in waves.  He has not appreciated any dysuria or diarrhea.  No nausea or  vomiting.  He has not tried any medication for this.  He has also intermittently had right lower quadrant pain and feels like he can feel a mass thinks it may be a hernia.  No other complaints at this time. [CC]   1429 Patient arrived awake and alert but significantly hypertensive at 193/115.  Does not seem in severe pain denies CVA tenderness, no testicle tenderness, he is concerned about a hernia in his right inguinal area palpation of the area seems like a mobile lymph node.  Treated with IV fluids Zofran Toradol and methocarbamol because he feels like there is a muscular component to his pain as well.  CT scan of the abdomen pelvis shows significant constipation but no other acute pathology that I can appreciate no evidence of kidney stone to explain his flank pain and his urinalysis is clean with no blood further supporting no renal pathology.  Even after pain control he remains significantly hypertensive systolic over 190 so treating with 1 dose of labetalol.  We will confirm that he has taken his home amlodipine and if so I think he needs to have his dose increased. [CC]   1448 CBC reassuring without any anemia or leukocytosis.  Metabolic panel shows literally no abnormalities.  No elevation in lipase or lactate.  Normal magnesium. [CC]      ED Course User Index  [CC] Elliot Waterman MD     Discussed the work-up with him he says he has not been taking his blood pressure medications.  He is agreeable to close follow-up with his PCP on this.  Right now he is symptom-free.  Think he can safely discharge we discussed a bowel regimen for the constipation personally reviewed his CT scan with him.  Counseled on return precautions verbally expressed understanding of these.         Shared Decision Making:  After my consideration of clinical presentation and any laboratory/radiology studies obtained, I discussed the findings with the patient/patient representative who is in agreement with the treatment plan and  the final disposition.   Risks and benefits of discharge and/or observation/admission were discussed.       AS OF 16:59 EDT VITALS:    BP - (!) 190/127  HR - 96  TEMP - 97.5 °F (36.4 °C) (Oral)  O2 SATS - 100%                  DIAGNOSIS  Final diagnoses:   Flank pain   Hypertension, unspecified type   Constipation, unspecified constipation type         DISPOSITION  DISCHARGE    Patient discharged in stable condition.    Reviewed implications of results, diagnosis, meds, responsibility to follow up, warning signs and symptoms of possible worsening, potential complications and reasons to return to ER.    Patient/Family voiced understanding of above instructions.    Discussed plan for discharge, as there is no emergent indication for admission.  Pt/family is agreeable and understands need for follow up and possible repeat testing.  Pt/family is aware that discharge does not mean that nothing is wrong but that it indicates no emergency is currently present that requires admission and they must continue care with follow-up as given below or with a physician of their choice.     FOLLOW-UP  Soy Gomez PA  [address] [place] [address]  [phone]    Call   To arrange follow-up for blood pressure.         Medication List        New Prescriptions      polyethylene glycol 17 g packet  Commonly known as: MIRALAX  Take 17 g by mouth Daily.               Where to Get Your Medications        These medications were sent to [facility] [id number] - [place], [place] - [address] [place] [address] [address] [facility] - [phone]  - [phone] [place] [id number]      Phone: [phone]   polyethylene glycol 17 g packet             Please note that portions of this document were completed with voice recognition software.        Elliot Waterman MD  10/21/23 2147

## 2023-10-23 ENCOUNTER — TELEPHONE (OUTPATIENT)
Dept: FAMILY MEDICINE CLINIC | Facility: CLINIC | Age: 48
End: 2023-10-23
Payer: COMMERCIAL

## 2023-10-23 ENCOUNTER — OFFICE VISIT (OUTPATIENT)
Dept: FAMILY MEDICINE CLINIC | Facility: CLINIC | Age: 48
End: 2023-10-23
Payer: COMMERCIAL

## 2023-10-23 VITALS
BODY MASS INDEX: 21.05 KG/M2 | OXYGEN SATURATION: 98 % | WEIGHT: 155.4 LBS | HEIGHT: 72 IN | DIASTOLIC BLOOD PRESSURE: 94 MMHG | SYSTOLIC BLOOD PRESSURE: 142 MMHG | HEART RATE: 91 BPM

## 2023-10-23 DIAGNOSIS — I10 PRIMARY HYPERTENSION: Primary | ICD-10-CM

## 2023-10-23 DIAGNOSIS — K40.90 INGUINAL HERNIA, RIGHT: ICD-10-CM

## 2023-10-23 DIAGNOSIS — R10.31 RIGHT GROIN PAIN: ICD-10-CM

## 2023-10-23 DIAGNOSIS — I10 UNCONTROLLED HYPERTENSION: ICD-10-CM

## 2023-10-23 PROCEDURE — 99214 OFFICE O/P EST MOD 30 MIN: CPT | Performed by: NURSE PRACTITIONER

## 2023-10-23 PROCEDURE — 3077F SYST BP >= 140 MM HG: CPT | Performed by: NURSE PRACTITIONER

## 2023-10-23 PROCEDURE — 3080F DIAST BP >= 90 MM HG: CPT | Performed by: NURSE PRACTITIONER

## 2023-10-23 RX ORDER — NEBIVOLOL 5 MG/1
5 TABLET ORAL DAILY
Qty: 30 TABLET | Refills: 1 | Status: SHIPPED | OUTPATIENT
Start: 2023-10-23

## 2023-10-23 NOTE — PROGRESS NOTES
Subjective   Alex Fofana is a 47 y.o. male.   Chief Complaint   Patient presents with    Pelvic Pain     Went to ER Saturday. 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  States he did not continue the amlodipine his PCP Soy Gomez gave him; felt sick at his stomach and had headache. Was seen in the [facility] ED  10/212/23 with  complaint of groin mass and left flank pain, uncontrolled BP, given labetalol. Had CT of abdomen, moderate stool, no kidney stones or other acute abnormality. Physical exam in the ED indicated right lymph node, US not performed.   Has been taking miralax for constipation,    The following portions of the patient's history were reviewed and updated as appropriate: allergies, current medications, past family history, past medical history, past social history, past surgical history, and problem list.    Review of Systems   Constitutional:  Negative for chills, fatigue and fever.   Eyes:  Negative for visual disturbance.   Respiratory:  Negative for shortness of breath.    Cardiovascular:  Negative for chest pain, palpitations and leg swelling.   Gastrointestinal:  Positive for constipation. Negative for abdominal pain.        Right groin pain   Genitourinary:  Negative for difficulty urinating, dysuria, scrotal swelling and testicular pain.   Skin:  Negative for color change, pallor, rash and wound.   Neurological:  Positive for numbness (occ both hands). Negative for dizziness, light-headedness and headaches.       Objective   Physical Exam  Vitals reviewed.   Constitutional:       General: He is not in acute distress.     Appearance: Normal appearance. He is not ill-appearing, toxic-appearing or diaphoretic.   HENT:      Head: Normocephalic and atraumatic.   Neck:      Vascular: No carotid bruit.  "  Cardiovascular:      Rate and Rhythm: Normal rate and regular rhythm.   Pulmonary:      Effort: Pulmonary effort is normal. No respiratory distress.      Breath sounds: Normal breath sounds.   Abdominal:      General: Abdomen is flat. Bowel sounds are normal. There is no distension.      Palpations: Abdomen is soft.      Hernia: A hernia (right inguinal ; greater than golf ball sized with pressure of sitting from lying position) is present.   Musculoskeletal:      Right lower leg: No edema.      Left lower leg: No edema.   Neurological:      Mental Status: He is alert and oriented to person, place, and time.   Psychiatric:         Behavior: Behavior normal.         Thought Content: Thought content normal.         Judgment: Judgment normal.       /94   Pulse 91   Ht 182.9 cm (72.01\")   Wt 70.5 kg (155 lb 6.4 oz)   SpO2 98%   BMI 21.07 kg/m²     Assessment & Plan   Problems Addressed this Visit          Cardiac and Vasculature    Hypertension - Primary    Relevant Medications    nebivolol (Bystolic) 5 MG tablet     Other Visit Diagnoses       Uncontrolled hypertension        Relevant Medications    nebivolol (Bystolic) 5 MG tablet    Right groin pain        Relevant Orders    US Nonvascular Extremity Limited    Ambulatory Referral to General Surgery (Completed)    Inguinal hernia, right        Relevant Orders    US Nonvascular Extremity Limited    Ambulatory Referral to General Surgery (Completed)          Diagnoses         Codes Comments    Primary hypertension    -  Primary ICD-10-CM: I10  ICD-9-CM: 401.9     Uncontrolled hypertension     ICD-10-CM: I10  ICD-9-CM: 401.9     Right groin pain     ICD-10-CM: R10.31  ICD-9-CM: 789.03     Inguinal hernia, right     ICD-10-CM: K40.90  ICD-9-CM: 550.90             Hypertension is not controlled, patient did not tolerate amlodipine.  Will try nebivolol.  Had a serious discussion with patient regarding risks of uncontrolled hypertension including stroke or " death.  Patient verbalized understanding and agrees to take medication.  He will follow-up in 2 weeks with his PCP.  Referred to general surgery and for ultrasound of right groin inguinal hernia.  Patient put on lift restrictions of no more than 15 pounds.  Description of mass in the emergency department was small lymph node, so appears to have increased in size significantly compared to then  Patient was encouraged to keep me informed of any acute changes, lack of improvement, or any new concerning symptoms.

## 2023-10-23 NOTE — TELEPHONE ENCOUNTER
Caller: Alex Fofana    Relationship: Self    Best call back number: [phone]     Who is your current provider: Soy Gomez PA     Who would you like your new provider to be: JOHN BERMEO    What are your reasons for transferring care: N/A    Additional notes: PATIENT WOULD LIKE TO SWITCH TO JOHN BERMEO FOR PRIMARY CARE.

## 2023-10-23 NOTE — TELEPHONE ENCOUNTER
Pt saw Martha for a same day appt today. He is requesting a letter regarding lift restrictions for his job due to his hernia. Please advise.

## 2023-10-23 NOTE — LETTER
October 23, 2023     Patient: Alex Fofana   YOB: 1975   Date of Visit: 10/23/2023       To Whom It May Concern:    It is my medical opinion that Alex Fofana  should avoid lifting anything over 15 pounds until he is evaluated by a surgeon. .           Sincerely,        HEVER Sam    CC: No Recipients

## 2023-10-24 ENCOUNTER — TELEPHONE (OUTPATIENT)
Dept: FAMILY MEDICINE CLINIC | Facility: CLINIC | Age: 48
End: 2023-10-24
Payer: COMMERCIAL

## 2023-10-24 NOTE — TELEPHONE ENCOUNTER
Caller: Alxe Fofana     Relationship: SELF     Best call back number: [phone]     What is your medical concern? PATIENT STATES THERE HAS BEEN A CHANGE IN HIS BOWEL MOVEMENTS.HE HAS NOT HAD ONE YESTERDAY NOR TODAY    How long has this issue been going on?  2 DAYS    Is your provider already aware of this issue? NO    Have you been treated for this issue? NO

## 2023-10-24 NOTE — TELEPHONE ENCOUNTER
Informed patient of recommendations made by Soy KATHLEEN. He did not have any other sx at this time. I did advise him if Martha LOPEZ needed to change anything further we would give him a callback. Otherwise to follow plan from PCP.  Pt verbalized understanding and had no further questions or concerns at this time.

## 2023-10-24 NOTE — TELEPHONE ENCOUNTER
Patient informed of letter being sent to his MyChart for print. I did explain if he has any issues with this he can stop by the office and we can print it.

## 2023-10-24 NOTE — TELEPHONE ENCOUNTER
This is not significantly concerning if he does not have other symptoms like new abdominal pain, inability to pass flatus, or vomiting. He can use miralax once daily the next few days and an OTC stool softener until he has a BM. If he goes longer than 1 week without a BM or has worsening symptoms, he will need an appointment. Pt also saw Martha yesterday for an inguinal hernia. I will forward this message to her to see if she has other instructions.

## 2023-10-25 ENCOUNTER — TELEPHONE (OUTPATIENT)
Dept: FAMILY MEDICINE CLINIC | Facility: CLINIC | Age: 48
End: 2023-10-25
Payer: COMMERCIAL

## 2023-10-25 NOTE — TELEPHONE ENCOUNTER
It looks like he saw Martha for this on Monday. I will forward this message to her for review. She will be back in the office on Friday. I would need to see him to put him off work.

## 2023-10-25 NOTE — TELEPHONE ENCOUNTER
Caller: Raquel Fofana    Relationship: Emergency Contact    Best call back number: [phone]    What is the best time to reach you: ANYTIME     Who are you requesting to speak with (clinical staff, provider,  specific staff member): CLINICAL STAFF    What was the call regarding: PATIENT'S WIFE IS CALLING TO SEE IF THE PATIENT CAN HAVE ANOTHER LETTER EXCUSING HIM FROM WORK FOR AT LEAST 30 DAYS. SHE SAYS THAT HE IS IN PAIN WHEN HE MOVES AND SINCE HE HAS TO DO SO MUCH LIFTING HE IS UNABLE TO DO HIS JOB.   PATIENT WILL BE COMING BUY TODAY TO GET THIS.   Is it okay if the provider responds through [employer]: YES

## 2023-10-25 NOTE — TELEPHONE ENCOUNTER
Caller: Raquel Fofana    Relationship: Emergency Contact    Best call back number: [phone]    What orders are you requesting (i.e. lab or imaging): ULTRASOUND    Additional notes: THE CALLER STATES THAT THE PATIENT IS SCHEDULE TO HAVE THE ULTRASOUND ON SUNDAY THE CALLER WOULD LIKE TO BE ABLE TO GET THE INTRASOUND DON TOMORROW OR FRIDAY SHE WOULD LIKE TO KNOW IF A STAT ORDER CAN BE SENT IN

## 2023-10-25 NOTE — TELEPHONE ENCOUNTER
Martha ordered this for an inguinal hernia. According to her note, it looks like this can wait until 10/29 as scheduled. If his symptoms have worsened, he would need to be reassessed with an appointment.

## 2023-10-25 NOTE — TELEPHONE ENCOUNTER
Unable to leave message for Alex Fofana  to return my call.    Hub may relay message and document    Soy Gomez PA1 hour ago (12:27 PM)       Martha ordered this for an inguinal hernia. According to her note, it looks like this can wait until 10/29 as scheduled. If his symptoms have worsened, he would need to be reassessed with an appointment.      Please also see other telephone encounter regarding work note.

## 2023-10-25 NOTE — TELEPHONE ENCOUNTER
Unable to LVM Alex Fofana  to return my call.    Hub may relay message and document    Soy Gomez PA1 hour ago (12:24 PM)       It looks like he saw Martha for this on Monday. I will forward this message to her for review. She will be back in the office on Friday. I would need to see him to put him off work.       Please also see other telephone encounter regarding imaging.

## 2023-10-26 ENCOUNTER — TELEPHONE (OUTPATIENT)
Dept: FAMILY MEDICINE CLINIC | Facility: CLINIC | Age: 48
End: 2023-10-26
Payer: COMMERCIAL

## 2023-10-26 ENCOUNTER — OFFICE VISIT (OUTPATIENT)
Dept: FAMILY MEDICINE CLINIC | Facility: CLINIC | Age: 48
End: 2023-10-26
Payer: COMMERCIAL

## 2023-10-26 VITALS
BODY MASS INDEX: 21.02 KG/M2 | SYSTOLIC BLOOD PRESSURE: 138 MMHG | HEART RATE: 88 BPM | WEIGHT: 155.2 LBS | HEIGHT: 72 IN | TEMPERATURE: 98.2 F | OXYGEN SATURATION: 99 % | DIASTOLIC BLOOD PRESSURE: 84 MMHG

## 2023-10-26 DIAGNOSIS — R19.09 INGUINAL BULGE: Primary | ICD-10-CM

## 2023-10-26 DIAGNOSIS — K59.00 CONSTIPATION, UNSPECIFIED CONSTIPATION TYPE: ICD-10-CM

## 2023-10-26 NOTE — TELEPHONE ENCOUNTER
He does not need a stat order based on exam today. He also told me he was seeing surgeon later today, they may be able to schedule this sooner for him.

## 2023-10-26 NOTE — TELEPHONE ENCOUNTER
Patient spouse called & is scheduled for the U/S on 10/29/2023.  Patient spouse would like this to be a stat order.  The current order is routine.  A new order would be needed.  Please advise.

## 2023-10-26 NOTE — LETTER
October 26, 2023     Patient: Alex Fofana   YOB: 1975   Date of Visit: 10/26/2023       To Whom It May Concern:    It is my medical opinion that Alex Fofana should remain off from work until he is evaluated by a surgeon. He was seen in office on 10/23/23 and light duty restrictions were recommended at that time. He is scheduled for consultation on 10/26/23.            Sincerely,        Veronica Christensen,     CC: No Recipients

## 2023-10-26 NOTE — PROGRESS NOTES
Chief Complaint   Patient presents with    Hypertension    Groin Pain       HPI:  Alex Fofana is a 47 y.o. male who presents today for above chief complaint.    Seen two days ago in office for BP issues and groin bulge. See documentation from 10/23 for full historical details.   He has noticed a bulge in the right groin/suprapubic region over the last few days. Was seen in ER last weekend and had CT A/P performed which did not show hernia but feels that the area has gotten larger since that time. He reports discomfort from the area too and has now developed constipation and wondering if it could be related. When he saw his PCP on 10/23 an US and general surgery referral were placed. He states that he has an appt with Gen Surg this afternoon and his US is scheduled for Sunday. He has given restrictions for light duty but states that his work let him off for up to 30 days. He is requesting a letter documenting his visit on 10/23 and excusing him until his visit with Gen Surg.    Last BM was on Sunday. Typically has BM 2-3 times per day. He has noticed some abdominal distension. Was able to pass flatus last night and this improved his symptoms and distension. Hasn't been drinking a lot of water because he feared it would worsen his abdominal discomfort.      PE:  Vitals:    10/26/23 0748   BP: 138/84   Pulse: 88   Temp: 98.2 °F (36.8 °C)   SpO2: 99%      Body mass index is 21.04 kg/m².    Gen Appearance: NAD  HEENT: Normocephalic, EOMI  Lungs: Normal respiratory effort  Abd/Pelvis: There is a mild prominence/bulge in the right suprapubic region that is mildly tender. No overlying skin color changes.  Neuro: No focal deficits    Current Outpatient Medications   Medication Sig Dispense Refill    loratadine (CLARITIN) 10 MG tablet Take 1 tablet by mouth Daily.      methocarbamol (ROBAXIN) 500 MG tablet Take 1 tablet by mouth 3 (Three) Times a Day As Needed for Muscle Spasms. 30 tablet 0    nebivolol (Bystolic) 5 MG  tablet Take 1 tablet by mouth Daily. 30 tablet 1    polyethylene glycol (MIRALAX) 17 g packet Take 17 g by mouth Daily. 100 each 0     No current facility-administered medications for this visit.        A/P:  Diagnoses and all orders for this visit:    1. Inguinal bulge (Primary)    Uncertain if this is a hernia based on exam today. If there is a hernia present it appears quite small. Proceed w surgical consultation and ultrasound as previously ordered. Advised that I would be able to write him off work until surgical evaluation but could not write any extended period of time until we have imaging confirmation or until otherwise recommended by surgery. Letter was provided to patient during his visit.    We also discussed ER precautions for any worsening pain, nausea, vomiting, skin color changes or increased swelling of the area.    For his constipation, I recommended increasing water intake, fiber supplement and miralax as last resort.    No follow-ups on file.     Dictated Utilizing Dragon Dictation    Please note that portions of this note were completed with a voice recognition program.    Part of this note may be an electronic transcription/translation of spoken language to printed text using the Dragon Dictation System.

## 2023-10-29 ENCOUNTER — HOSPITAL ENCOUNTER (OUTPATIENT)
Dept: ULTRASOUND IMAGING | Facility: HOSPITAL | Age: 48
Discharge: HOME | End: 2023-10-29
Admitting: NURSE PRACTITIONER
Payer: COMMERCIAL

## 2023-10-29 DIAGNOSIS — K40.90 INGUINAL HERNIA, RIGHT: ICD-10-CM

## 2023-10-29 DIAGNOSIS — R10.31 RIGHT GROIN PAIN: ICD-10-CM

## 2023-10-29 PROCEDURE — 76882 US LMTD JT/FCL EVL NVASC XTR: CPT

## 2023-10-31 ENCOUNTER — TELEPHONE (OUTPATIENT)
Dept: FAMILY MEDICINE CLINIC | Facility: CLINIC | Age: 48
End: 2023-10-31
Payer: COMMERCIAL

## 2023-10-31 NOTE — TELEPHONE ENCOUNTER
I returned patient's call and forwarded the US report to Dr. Mendiola , the surgeon who has Alex scheduled for surgery. Once I hear back for  will let patient know if he will proceed as scheduled or if I will refer to vascular surgery for evaluation of varicose veins in his groin

## 2023-10-31 NOTE — TELEPHONE ENCOUNTER
Caller: Raquel Fofana    Relationship: Emergency Contact    Best call back number: [phone]     Caller requesting test results: NO    What test was performed: US Nonvascular Extremity Limited    When was the test performed: 10/29/23    Where was the test performed: [facility]     Additional notes: PATIENT WANTED TO KNOW IF JOHN BERMEO HAS HAD A CHANCE TO LOOK OVER HIS ULTRASOUND RESULTS. PATIENT WOULD LIKE TO SPEAK WITH HER REGARDING THE IMAGES AND FOLLOW UP PROCEDURE.

## 2023-11-01 DIAGNOSIS — R19.09 RIGHT GROIN MASS: Primary | ICD-10-CM

## 2023-11-02 ENCOUNTER — TELEPHONE (OUTPATIENT)
Dept: FAMILY MEDICINE CLINIC | Facility: CLINIC | Age: 48
End: 2023-11-02
Payer: COMMERCIAL

## 2023-11-02 NOTE — TELEPHONE ENCOUNTER
Please find out if he has a form or if he just needs a letter to be off work. He does not have to come in for an appt, I will sign it.

## 2023-11-02 NOTE — TELEPHONE ENCOUNTER
Caller: Raquel Fofana    Relationship: Emergency Contact    Best call back number: [phone]     What form or medical record are you requesting: FORM FILLED OUT FOR PATIENT TO BE OFF WORK UNTIL DIAGNOSE.     Who is requesting this form or medical record from you: PATIENT AND EMPLOYER    How would you like to receive the form or medical records (pick-up, mail, fax): FAX  If fax, what is the fax number:   If mail, what is the address:   If pick-up, provide patient with address and location details    Timeframe paperwork needed: ASAP    Additional notes: PATIENT IS NEED PAPER WORK FILLED OUT FOR EMPLOYER STATED HE NEEDS TO BE OFF WORK FOR A PERIOD OF TIME.

## 2023-11-02 NOTE — LETTER
November 6, 2023     Patient: Alex Fofana   YOB: 1975   Date of Visit: 11/2/2023       To Whom It May Concern:    It is my medical opinion that Alex Fofana should remain out of work until until his MRI is completed and it is determined which specialist he needs to see and if surgery is indicated. .           Sincerely,        HEVER Sam    CC: No Recipients

## 2023-11-03 ENCOUNTER — TELEPHONE (OUTPATIENT)
Dept: FAMILY MEDICINE CLINIC | Facility: CLINIC | Age: 48
End: 2023-11-03
Payer: COMMERCIAL

## 2023-11-03 NOTE — TELEPHONE ENCOUNTER
Patient stated he needs a letter just extending his leave to take to his employer since his surgery was postponed. He would like to  a copy from the office once its complete as he is not sure it would print from his MyChart.

## 2023-11-03 NOTE — TELEPHONE ENCOUNTER
Caller: Raquel Fofana    Relationship: Emergency Contact    Best call back number:       [phone] (Mobile)     What form or medical record are you requesting:     CALLER/WIFE STATED SHE FAXED A FORM FOR COMPLETION BY KALEN NEGRON FOR PATIENT THAT IS REQUIRED BY [facility]     How would you like to receive the form or medical records (pick-up, mail, fax):     CALLER STATED ONCE FORM IS COMPLETED AND SIGNED, PLEASE CONTACT HER FOR

## 2023-11-06 NOTE — TELEPHONE ENCOUNTER
A letter to be excused from work is in MyChart, I have still not seen the form his wife dropped off last week, please check on this

## 2023-11-06 NOTE — TELEPHONE ENCOUNTER
I informed patient that his letter was sent to his MyChart for print. However if he cannot print it from home he can stop by the office and we can print it for him.  Pt verbalized understanding and had no further questions or concerns at this time.

## 2023-11-07 ENCOUNTER — TELEPHONE (OUTPATIENT)
Dept: FAMILY MEDICINE CLINIC | Facility: CLINIC | Age: 48
End: 2023-11-07
Payer: COMMERCIAL

## 2023-11-07 NOTE — TELEPHONE ENCOUNTER
ANTICOAGULATION SERVICE    Yeny Benton is a 63 y.o. male with PMHx significant for AVR (re-do in May, 2017), CAD s/p CABG (x3 in May, 2017), pA-fib, HLD, HTN, testicular CA who presents to clinic on 4/23/2021 for anticoagulation monitoring and adjustment.     Anticoagulation Indication(s):  Heart Valve Replacement (bovine AVR), A-fib  Referring Physician:  Dr. Kenya Jorge  Goal INR Range:  2-3  Duration of Anticoagulation Therapy:  TBD  Time of day dose taken:  PM  Product patient has at home:  warfarin 5 mg (peach)    [id number] Score for Atrial Fibrillation Stroke Risk   Risk   Factors  Component Value   C CHF No 0   H HTN Yes 1   A2 Age >= 75 No,  (59 y.o.) 0   D DM No 0   S2 Prior Stroke/TIA No 0   V Vascular Disease Yes 1   A Age 74-69 No,  (59 y.o.) 0   Sc Sex male 0    [id number]  Score  2   Score last updated 5/30/19 1:51 PM      Lab Results   Component Value Date    RBC 3.70 (L) 07/30/2019    HGB 12.4 (L) 07/30/2019    HCT 36.5 (L) 07/30/2019    MCV 98.7 07/30/2019    MCH 33.5 07/30/2019    MPV 7.6 07/30/2019    RDW 15.6 (H) 07/30/2019     07/30/2019     INR Summary                            Warfarin regimen (mg)  Date INR   A/P    Sun Mon Tue Wed Thu Fri Sat Mg/wk  4/23 2.7 At goal, no change  5 5 5 5 5 5 5 35  4/5 3.6 Above goal, hold x 1  5 0/5 5 5 5 5 5 35   3/8 3.0 At goal, no change  5 5 5 5 5 5 5 35  2/19 2.0 At goal, no change  5 5 5 5 5 5 5 35  2/4 2.7 At goal, no change  5 5 5 5 5 5 5 35  1/20 2.7 At goal, no change  5 5 5 5 5 5 5 35  1/11 4.5 Above goal (Cymbalta) 5 0/5 0/5 5 5 5 5 35  12/28 2.0 At goal, no change  7.5 5 5 5 5 5 7.5 40  11/24 2.4 At goal, no change  7.5 5 5 5 5 5 7.5 40  10/30 2.5 At goal, no change  7.5 5 5 5 5 5 7.5 40  10/2 2.2 At goal, no change  7.5 5 5 5 5 5 7.5 40  9/3 2.4 At goal, no change  7.5 5 5 5 5 5 7.5 40  8/3 2.9 At goal, no change  7.5 5 5 5 5 5 7.5 40  7/6 2.7 At goal, no change  7.5 5 5 5 5 5 7.5 40  6/8 2.8 At goal, no change  7.5 5 5 5 5 5 7.5 40  5/11 Called Patient. No Answer. Left Vm. Stating paperwork is complete and ready for  anytime between Monday-Friday 8am- 4pm on the first floor.   2.9 At goal, no change  7.5 5 5 5 5 5 7.5 40  3/30 3.0 At goal, no change  7.5 5 5 5 5 5 7.5 40  2/26 2.3 At goal, no change  7.5 5 5 5 5 5 7.5 40  2/5 3.3 Above goal, decrease  7.5 5 5 5 5 5 7.5 40  1/8 2.9 At goal, no change  7.5 7.5 5 5 5 5 7.5 42.5    Patient History:  Recent hospitalizations/HC visits -4/9 Dr. Castro Feldman: no med changes  -12/28 Dr. Abbie Savage (PCP office): prescribed Cymbalta    Recent medication changes 12/28 Cymbalta 30 mg daily per PCP (may increase INR); stopped taking on 1/30; resumed ~2/4   Medications taken regularly that may interact with warfarin or alter INR ASA 81 mg   Warfarin dose taken as prescribed Yes  Usually compliant   Does not use pillbox  Patient has been taking warfarin since re-do AVR in May, 2017   Signs/symptoms of bleeding No h/o major bleeding   Vitamin K intake Normally has ~0 servings of green, leafy vegetables per week   Recent vomiting/diarrhea/fever, changes in weight or activity level None reported   Tobacco or alcohol use -No recent changes in smoking as of 11/24/20 (~3/4 PPD)  -Patient cut back from 2 PPD to 3/4 PPD in February (2019) when he moved in with his daughter. Decrease in smoking typically increases INR gradually.   -Patient denies any alcohol or illicit drug use   Upcoming surgeries or procedures None reported  Back stimulator implant canceled due to cost     Assessment/Plan:  Patient's INR was therapeutic today (2.7). He denies any warfarin dosing errors, change in vitamin K intake, medication changes, illness, or bleeding since last visit. Patient was instructed to continue warfarin 5 mg daily. Repeat INR in 4 weeks. Patient was reminded to maintain consistent vitamin K intake and call with any bleeding, medication changes, or fever/vomiting/diarrhea. Warfarin dose was reduced to 5 mg daily on 1/11 due to interaction with Cymbalta, and INR has been mostly therapeutic since then.   Prior to starting Cymbalta on 12/28, patient's INR had been therapeutic on warfarin 40 mg/week for almost a year. He plans to continue Cymbalta for now, but states that after taking it for a few months, he hasn't noticed improvement in his mood and continues to have bothersome side effects. He may ask his PCP to switch to something else at next visit. Patient understands dosing directions and information discussed. Dosing schedule and follow up appointment given to patient. Progress note routed to referring physician's office. Patient acknowledges working in consult agreement with pharmacist as referred by his/her physician. Next INR Check:  5/21    Please call [facility] at [phone] with any questions. Thanks! Khushbu Fox.  Shannon Nesbitt, PharmD, [facility]  [facility]  Ph: [phone]  4/23/2021 9:28 AM    CLINICAL PHARMACY CONSULT: MED RECONCILIATION/REVIEW ADDENDUM    For Pharmacy Admin Tracking Only    PHSO: Yes  Total # of Interventions Recommended: 0  - Refills Provided #: 0  - Maintenance Safety Lab Monitoring #: 1  Total Interventions Accepted: 0  Time Spent (min): 15

## 2023-11-09 ENCOUNTER — TELEPHONE (OUTPATIENT)
Dept: FAMILY MEDICINE CLINIC | Facility: CLINIC | Age: 48
End: 2023-11-09
Payer: COMMERCIAL

## 2023-11-09 ENCOUNTER — HOSPITAL ENCOUNTER (OUTPATIENT)
Dept: MRI IMAGING | Facility: HOSPITAL | Age: 48
Discharge: HOME | End: 2023-11-09
Admitting: NURSE PRACTITIONER
Payer: COMMERCIAL

## 2023-11-09 DIAGNOSIS — R19.09 RIGHT GROIN MASS: ICD-10-CM

## 2023-11-09 PROCEDURE — 0 GADOBENATE DIMEGLUMINE 529 MG/ML SOLUTION: Performed by: NURSE PRACTITIONER

## 2023-11-09 PROCEDURE — 72197 MRI PELVIS W/O & W/DYE: CPT

## 2023-11-09 PROCEDURE — A9577 INJ MULTIHANCE: HCPCS | Performed by: NURSE PRACTITIONER

## 2023-11-09 RX ADMIN — GADOBENATE DIMEGLUMINE 14 ML: 529 INJECTION, SOLUTION INTRAVENOUS at 14:05

## 2023-11-09 NOTE — TELEPHONE ENCOUNTER
[facility] called. She stated that the provider had ordered an MRI of the abdomen with and without contrast. She said this test would not show the provider what they wanted to see. She said that the order needed to be changed to an MRI of the pelvis, and if the provider could give her a verbal order, she could change it.  I called Martha and she said the order could be changed to an MRI of the pelvis.  I let [facility] know it was okay to change the MRI abdomen to an MRI pelvis.    No further action needed on this call at this time.

## 2023-11-20 ENCOUNTER — LAB (OUTPATIENT)
Dept: LAB | Facility: HOSPITAL | Age: 48
End: 2023-11-20
Payer: COMMERCIAL

## 2023-11-20 ENCOUNTER — OFFICE VISIT (OUTPATIENT)
Dept: FAMILY MEDICINE CLINIC | Facility: CLINIC | Age: 48
End: 2023-11-20
Payer: COMMERCIAL

## 2023-11-20 VITALS
HEART RATE: 70 BPM | DIASTOLIC BLOOD PRESSURE: 86 MMHG | SYSTOLIC BLOOD PRESSURE: 144 MMHG | OXYGEN SATURATION: 99 % | BODY MASS INDEX: 21.45 KG/M2 | HEIGHT: 72 IN | WEIGHT: 158.4 LBS | TEMPERATURE: 98.4 F

## 2023-11-20 DIAGNOSIS — I10 PRIMARY HYPERTENSION: ICD-10-CM

## 2023-11-20 DIAGNOSIS — Z11.3 ROUTINE SCREENING FOR STI (SEXUALLY TRANSMITTED INFECTION): ICD-10-CM

## 2023-11-20 DIAGNOSIS — K40.90 RIGHT INGUINAL HERNIA: ICD-10-CM

## 2023-11-20 DIAGNOSIS — Z13.21 ENCOUNTER FOR VITAMIN DEFICIENCY SCREENING: ICD-10-CM

## 2023-11-20 DIAGNOSIS — Z12.11 COLON CANCER SCREENING: ICD-10-CM

## 2023-11-20 DIAGNOSIS — E55.9 VITAMIN D DEFICIENCY: ICD-10-CM

## 2023-11-20 DIAGNOSIS — Z00.00 ANNUAL PHYSICAL EXAM: Primary | ICD-10-CM

## 2023-11-20 LAB
BASOPHILS # BLD AUTO: 0.03 10*3/MM3 (ref 0–0.2)
BASOPHILS NFR BLD AUTO: 0.6 % (ref 0–1.5)
DEPRECATED RDW RBC AUTO: 39.7 FL (ref 37–54)
EOSINOPHIL # BLD AUTO: 0.07 10*3/MM3 (ref 0–0.4)
EOSINOPHIL NFR BLD AUTO: 1.4 % (ref 0.3–6.2)
ERYTHROCYTE [DISTWIDTH] IN BLOOD BY AUTOMATED COUNT: 11.8 % (ref 12.3–15.4)
HCT VFR BLD AUTO: 42.4 % (ref 37.5–51)
HGB BLD-MCNC: 14.3 G/DL (ref 13–17.7)
IMM GRANULOCYTES # BLD AUTO: 0.01 10*3/MM3 (ref 0–0.05)
IMM GRANULOCYTES NFR BLD AUTO: 0.2 % (ref 0–0.5)
LYMPHOCYTES # BLD AUTO: 1.74 10*3/MM3 (ref 0.7–3.1)
LYMPHOCYTES NFR BLD AUTO: 33.8 % (ref 19.6–45.3)
MCH RBC QN AUTO: 31 PG (ref 26.6–33)
MCHC RBC AUTO-ENTMCNC: 33.7 G/DL (ref 31.5–35.7)
MCV RBC AUTO: 92 FL (ref 79–97)
MONOCYTES # BLD AUTO: 0.48 10*3/MM3 (ref 0.1–0.9)
MONOCYTES NFR BLD AUTO: 9.3 % (ref 5–12)
NEUTROPHILS NFR BLD AUTO: 2.82 10*3/MM3 (ref 1.7–7)
NEUTROPHILS NFR BLD AUTO: 54.7 % (ref 42.7–76)
NRBC BLD AUTO-RTO: 0 /100 WBC (ref 0–0.2)
PLATELET # BLD AUTO: 183 10*3/MM3 (ref 140–450)
PMV BLD AUTO: 9.7 FL (ref 6–12)
RBC # BLD AUTO: 4.61 10*6/MM3 (ref 4.14–5.8)
WBC NRBC COR # BLD AUTO: 5.15 10*3/MM3 (ref 3.4–10.8)

## 2023-11-20 PROCEDURE — 82607 VITAMIN B-12: CPT | Performed by: NURSE PRACTITIONER

## 2023-11-20 PROCEDURE — 82746 ASSAY OF FOLIC ACID SERUM: CPT | Performed by: NURSE PRACTITIONER

## 2023-11-20 PROCEDURE — G0432 EIA HIV-1/HIV-2 SCREEN: HCPCS | Performed by: NURSE PRACTITIONER

## 2023-11-20 PROCEDURE — 80050 GENERAL HEALTH PANEL: CPT | Performed by: NURSE PRACTITIONER

## 2023-11-20 PROCEDURE — 86803 HEPATITIS C AB TEST: CPT | Performed by: NURSE PRACTITIONER

## 2023-11-20 PROCEDURE — 86592 SYPHILIS TEST NON-TREP QUAL: CPT | Performed by: NURSE PRACTITIONER

## 2023-11-20 PROCEDURE — 36415 COLL VENOUS BLD VENIPUNCTURE: CPT | Performed by: NURSE PRACTITIONER

## 2023-11-20 PROCEDURE — 80061 LIPID PANEL: CPT | Performed by: NURSE PRACTITIONER

## 2023-11-20 PROCEDURE — 82306 VITAMIN D 25 HYDROXY: CPT | Performed by: NURSE PRACTITIONER

## 2023-11-20 NOTE — PROGRESS NOTES
Patient Care Team:  Martha Lundberg APRN as PCP - General (Nurse Practitioner)     Chief Complaint   Patient presents with    Hypertension    Annual Exam       Chief complaint: Patient is in today for a physical     Patient is a 47 y.o. male who presents for his yearly physical exam.     HPI   Patient is here for physical, is now scheduled for right inguinal hernia repair. Currently off work until this is done.   Continues to have pain all day long in right hernia, is not severe, and waxes and wanes.  Constipation is controlled; reading his Bible has helped with worry, he has 5 sons, 3 (one set of twins) are adults, 2 younger still at home.    Review of Systems   Constitutional:  Positive for fatigue. Negative for appetite change, chills, diaphoresis, fever and unexpected weight change.   Eyes:  Negative for photophobia, redness and visual disturbance.   Respiratory:  Negative for cough, shortness of breath and wheezing.    Cardiovascular:  Negative for chest pain, palpitations and leg swelling.   Gastrointestinal:  Positive for abdominal pain (right groin and lower abdomen) and constipation (controlled). Negative for blood in stool, diarrhea, nausea and vomiting.   Endocrine: Negative for polydipsia and polyuria.   Genitourinary:  Negative for difficulty urinating, dysuria, genital sores and testicular pain.   Musculoskeletal:  Negative for arthralgias and back pain.   Skin:  Negative for color change, pallor, rash and wound.   Neurological:  Positive for headaches (occ sinus or if BP very high). Negative for dizziness and light-headedness.   Psychiatric/Behavioral:  Positive for sleep disturbance (pain or discomfort related to hernia). Negative for dysphoric mood, self-injury and suicidal ideas. The patient is not nervous/anxious.       History  Past Medical History:   Diagnosis Date    GERD (gastroesophageal reflux disease)     Hypertension     Migraines       History reviewed. No pertinent surgical  history.   Allergies   Allergen Reactions    Erythromycin       Family History   Problem Relation Age of Onset    Diabetes Mother     Heart attack Mother     Hypertension Mother     Kidney disease Mother     Arthritis Father     Pneumonia Father     Pancreatic cancer Sister     Hypertension Son     Breast cancer Maternal Aunt      Social History     Socioeconomic History    Marital status:    Tobacco Use    Smoking status: Never     Passive exposure: Never    Smokeless tobacco: Never   Vaping Use    Vaping Use: Never used   Substance and Sexual Activity    Alcohol use: Not Currently     Comment: quit in 2016    Drug use: Not Currently     Types: Marijuana     Comment: no current use    Sexual activity: Yes     Partners: Female     Birth control/protection: I.U.D.        Current Outpatient Medications:     loratadine (CLARITIN) 10 MG tablet, Take 1 tablet by mouth Daily., Disp: , Rfl:     methocarbamol (ROBAXIN) 500 MG tablet, Take 1 tablet by mouth 3 (Three) Times a Day As Needed for Muscle Spasms., Disp: 30 tablet, Rfl: 0    nebivolol (Bystolic) 5 MG tablet, Take 1 tablet by mouth Daily., Disp: 30 tablet, Rfl: 1    polyethylene glycol (MIRALAX) 17 g packet, Take 17 g by mouth Daily., Disp: 100 each, Rfl: 0    Immunizations   N/A   Prescribed/Refused   Date     Td/Tdap  (Booster Q 10 yrs)   []           Prescribed    []     Refused        []           Flu  (Yearly)   []        Prescribed    []     Refused        []           Pneumonia      []        Prescribed    []     Refused        []                 Hep B     []        Prescribed    []     Refused        []           Shingles  (Age 50 and older)   []        Prescribed    []     Refused        []           Immunization History   Administered Date(s) Administered    Tdap 08/05/2018, 06/18/2019     Health Maintenance   Topic Date Due    COLORECTAL CANCER SCREENING  Never done    COVID-19 Vaccine (1) Never done    HEPATITIS C SCREENING  Never done    ANNUAL  "PHYSICAL  10/02/2020    INFLUENZA VACCINE  03/31/2024 (Originally 8/1/2023)    TDAP/TD VACCINES (3 - Td or Tdap) 06/18/2029    Pneumococcal Vaccine 0-64  Aged Out        Diabetes  [] Yes  [x] No   N/A      Date     Eye Exam     []             []   Complete     []   Recommended Date:  Where:       Foot Exam     []         []   Complete          Obesity Counseling     []       []   Complete     No results found for: \"HGBA1C\", \"MICROALBUR\"    Additional Testing      Date     Colorectal Screening       []   N/A   []   Complete    [x]   Ordered     Date:    Where:       Pap      []   N/A   []   Complete   []   OB/GYN Date:    Where:       Mammogram        []   N/A   []   Complete   []  OB/GYN   []   Ordered Date:    Where:     PSA  (Over age 50)    []   N/A   []   Complete   []   Ordered Date:    Where:     US Aorta  (For male smokers, age 65)     []   N/A   []   Complete   []   Ordered Date:    Where:     CT for Smoker  (Age 55-75, 30 pk yr)    []   N/A   []   Complete   []   Ordered Date:    Where:     Bone Density/DEXA      []   N/A   []   Complete   []   Ordered Date:    Follow-up:     Hep. C        []   N/A   []   Complete   [x]   Ordered Date:    Where:     Results for orders placed or performed during the hospital encounter of 10/21/23   Comprehensive Metabolic Panel    Specimen: Arm, Left; Blood   Result Value Ref Range    Glucose 98 65 - 99 mg/dL    BUN 10 6 - 20 mg/dL    Creatinine 0.84 0.76 - 1.27 mg/dL    Sodium 141 136 - 145 mmol/L    Potassium 4.2 3.5 - 5.2 mmol/L    Chloride 103 98 - 107 mmol/L    CO2 29.0 22.0 - 29.0 mmol/L    Calcium 9.6 8.6 - 10.5 mg/dL    Total Protein 7.5 6.0 - 8.5 g/dL    Albumin 4.6 3.5 - 5.2 g/dL    ALT (SGPT) 19 1 - 41 U/L    AST (SGOT) 23 1 - 40 U/L    Alkaline Phosphatase 60 39 - 117 U/L    Total Bilirubin 0.6 0.0 - 1.2 mg/dL    Globulin 2.9 gm/dL    A/G Ratio 1.6 g/dL    BUN/Creatinine Ratio 11.9 7.0 - 25.0    Anion Gap 9.0 5.0 - 15.0 mmol/L    eGFR 108.9 >60.0 mL/min/1.73 " "  Lipase    Specimen: Arm, Left; Blood   Result Value Ref Range    Lipase 26 13 - 60 U/L   Urinalysis With Microscopic If Indicated (No Culture) - Urine, Clean Catch    Specimen: Urine, Clean Catch   Result Value Ref Range    Color, UA Yellow Yellow, Straw    Appearance, UA Clear Clear    pH, UA 7.0 5.0 - 8.0    Specific Gravity, UA <=1.005 1.001 - 1.030    Glucose, UA Negative Negative    Ketones, UA Negative Negative    Bilirubin, UA Negative Negative    Blood, UA Negative Negative    Protein, UA Negative Negative    Leuk Esterase, UA Negative Negative    Nitrite, UA Negative Negative    Urobilinogen, UA 0.2 E.U./dL 0.2 - 1.0 E.U./dL   Lactic Acid, Plasma    Specimen: Blood   Result Value Ref Range    Lactate 0.8 0.5 - 2.0 mmol/L   Magnesium    Specimen: Arm, Left; Blood   Result Value Ref Range    Magnesium 1.8 1.6 - 2.6 mg/dL   CBC Auto Differential    Specimen: Blood   Result Value Ref Range    WBC 7.39 3.40 - 10.80 10*3/mm3    RBC 5.06 4.14 - 5.80 10*6/mm3    Hemoglobin 16.0 13.0 - 17.7 g/dL    Hematocrit 47.2 37.5 - 51.0 %    MCV 93.3 79.0 - 97.0 fL    MCH 31.6 26.6 - 33.0 pg    MCHC 33.9 31.5 - 35.7 g/dL    RDW 11.7 (L) 12.3 - 15.4 %    RDW-SD 39.8 37.0 - 54.0 fl    MPV 9.2 6.0 - 12.0 fL    Platelets 219 140 - 450 10*3/mm3    Neutrophil % 66.0 42.7 - 76.0 %    Lymphocyte % 24.1 19.6 - 45.3 %    Monocyte % 8.5 5.0 - 12.0 %    Eosinophil % 0.7 0.3 - 6.2 %    Basophil % 0.4 0.0 - 1.5 %    Immature Grans % 0.3 0.0 - 0.5 %    Neutrophils, Absolute 4.88 1.70 - 7.00 10*3/mm3    Lymphocytes, Absolute 1.78 0.70 - 3.10 10*3/mm3    Monocytes, Absolute 0.63 0.10 - 0.90 10*3/mm3    Eosinophils, Absolute 0.05 0.00 - 0.40 10*3/mm3    Basophils, Absolute 0.03 0.00 - 0.20 10*3/mm3    Immature Grans, Absolute 0.02 0.00 - 0.05 10*3/mm3    nRBC 0.0 0.0 - 0.2 /100 WBC            /86 (BP Location: Right arm, Patient Position: Sitting, Cuff Size: Adult)   Pulse 70   Temp 98.4 °F (36.9 °C) (Oral)   Ht 182.9 cm (72.01\")   " Wt 71.8 kg (158 lb 6.4 oz)   SpO2 99%   BMI 21.48 kg/m²       Physical Exam  Vitals and nursing note reviewed.   Constitutional:       General: He is not in acute distress.     Appearance: Normal appearance. He is well-developed. He is not diaphoretic.   HENT:      Head: Normocephalic and atraumatic.      Right Ear: Tympanic membrane and external ear normal.      Left Ear: Tympanic membrane and external ear normal.      Nose: Nose normal.   Eyes:      General: No scleral icterus.        Right eye: No discharge.         Left eye: No discharge.      Conjunctiva/sclera: Conjunctivae normal.      Pupils: Pupils are equal, round, and reactive to light.   Neck:      Thyroid: No thyromegaly.      Vascular: No carotid bruit or JVD (no bruits).      Trachea: No tracheal deviation.   Cardiovascular:      Rate and Rhythm: Normal rate and regular rhythm.      Pulses: Normal pulses.      Heart sounds: Normal heart sounds. No murmur heard.     No friction rub. No gallop.   Pulmonary:      Effort: Pulmonary effort is normal. No respiratory distress.      Breath sounds: Normal breath sounds. No wheezing or rales.   Chest:      Chest wall: No tenderness.   Abdominal:      General: Bowel sounds are normal. There is no distension.      Palpations: Abdomen is soft. There is no mass.      Tenderness: There is no abdominal tenderness. There is no guarding or rebound.      Hernia: A hernia (right inguinal hernia , prominent with pressure from sitting up) is present.   Genitourinary:     Comments: deferred  Musculoskeletal:         General: No tenderness or deformity.      Cervical back: Normal range of motion and neck supple.      Right lower leg: No edema.      Left lower leg: No edema.   Lymphadenopathy:      Cervical: No cervical adenopathy.   Skin:     General: Skin is warm and dry.      Coloration: Skin is not pale.      Findings: No erythema or rash.   Neurological:      Mental Status: He is alert and oriented to person, place,  and time.      Motor: No abnormal muscle tone.      Deep Tendon Reflexes: Reflexes are normal and symmetric. Reflexes normal.   Psychiatric:         Behavior: Behavior normal.         Thought Content: Thought content normal.         Judgment: Judgment normal.             Counseling provided on diet and nutrition, hypertension, and anxiety.    Diagnoses and all orders for this visit:    Annual physical exam  -     CBC Auto Differential; Future  -     Comprehensive Metabolic Panel; Future  -     Lipid Panel; Future  -     TSH Rfx On Abnormal To Free T4; Future  -     CBC Auto Differential  -     Comprehensive Metabolic Panel  -     Lipid Panel  -     TSH Rfx On Abnormal To Free T4    Vitamin D deficiency  -     Vitamin D,25-Hydroxy; Future  -     Vitamin D,25-Hydroxy    Primary hypertension    Routine screening for STI (sexually transmitted infection)  -     HIV-1 / O / 2 Ag / Antibody; Future  -     Hepatitis C Antibody; Future  -     RPR; Future  -     HIV-1 / O / 2 Ag / Antibody  -     Hepatitis C Antibody  -     RPR    Encounter for vitamin deficiency screening  -     Vitamin B12; Future  -     Folate; Future  -     Vitamin B12  -     Folate    Right inguinal hernia    Colon cancer screening  -     Cologuard - Stool, Per Rectum; Future    Screening labs ordered to evaluate chronic conditions. I will contact patient regarding test results and provide instructions regarding any necessary changes in plan of care.  HTN is not well controlled, pain and anxiety from hernia may be contributing. Patient will check on a hernia belt to reduce bulging of hernia which increases pain. Scheduled for hernia repair, Monitor BP at home, goal 130/80 or less.  Nutrition and activity goals reviewed including: mainly water to drink, limit white flour/processed sugar, and processed foods, choose fresh fruits, vegetables, fish, lean meats,high fiber carbs, exercise  working toward 150 mins cardio per week, weight training  2x/week.  Patient was encouraged to keep me informed of any acute changes, lack of improvement, or any new concerning symptoms.   Reading scripture has helped to manage anxiety, continue with that.    Martha JOHN Lundberg   11/20/2023   12:29 EST          Please note that portions of this document were completed with a voice recognition program.     At [facility], we believe that sharing information builds trust and better relationships. You are receiving this note because you are receiving care at [facility] or have recently visited. It is possible you will see health information before a provider has talked with you about it. This kind of information can be easy to misunderstand. To help you fully understand what it means for your health, we urge you to discuss this note with your provider.

## 2023-11-21 LAB
25(OH)D3 SERPL-MCNC: 29.2 NG/ML (ref 30–100)
ALBUMIN SERPL-MCNC: 4.6 G/DL (ref 3.5–5.2)
ALBUMIN/GLOB SERPL: 1.8 G/DL
ALP SERPL-CCNC: 54 U/L (ref 39–117)
ALT SERPL W P-5'-P-CCNC: 17 U/L (ref 1–41)
ANION GAP SERPL CALCULATED.3IONS-SCNC: 9.9 MMOL/L (ref 5–15)
AST SERPL-CCNC: 19 U/L (ref 1–40)
BILIRUB SERPL-MCNC: 0.4 MG/DL (ref 0–1.2)
BUN SERPL-MCNC: 12 MG/DL (ref 6–20)
BUN/CREAT SERPL: 12.4 (ref 7–25)
CALCIUM SPEC-SCNC: 9.7 MG/DL (ref 8.6–10.5)
CHLORIDE SERPL-SCNC: 103 MMOL/L (ref 98–107)
CHOLEST SERPL-MCNC: 198 MG/DL (ref 0–200)
CO2 SERPL-SCNC: 27.1 MMOL/L (ref 22–29)
CREAT SERPL-MCNC: 0.97 MG/DL (ref 0.76–1.27)
EGFRCR SERPLBLD CKD-EPI 2021: 97.5 ML/MIN/1.73
FOLATE SERPL-MCNC: 10.7 NG/ML (ref 4.78–24.2)
GLOBULIN UR ELPH-MCNC: 2.6 GM/DL
GLUCOSE SERPL-MCNC: 77 MG/DL (ref 65–99)
HCV AB SER DONR QL: NORMAL
HDLC SERPL-MCNC: 92 MG/DL (ref 40–60)
HIV 1+2 AB+HIV1 P24 AG SERPL QL IA: NORMAL
LDLC SERPL CALC-MCNC: 97 MG/DL (ref 0–100)
LDLC/HDLC SERPL: 1.05 {RATIO}
POTASSIUM SERPL-SCNC: 4.9 MMOL/L (ref 3.5–5.2)
PROT SERPL-MCNC: 7.2 G/DL (ref 6–8.5)
RPR SER QL: NORMAL
SODIUM SERPL-SCNC: 140 MMOL/L (ref 136–145)
TRIGL SERPL-MCNC: 47 MG/DL (ref 0–150)
TSH SERPL DL<=0.05 MIU/L-ACNC: 1.2 UIU/ML (ref 0.27–4.2)
VIT B12 BLD-MCNC: 377 PG/ML (ref 211–946)
VLDLC SERPL-MCNC: 9 MG/DL (ref 5–40)

## 2023-12-08 ENCOUNTER — PRE-ADMISSION TESTING (OUTPATIENT)
Dept: PREADMISSION TESTING | Facility: HOSPITAL | Age: 48
End: 2023-12-08
Payer: COMMERCIAL

## 2023-12-08 VITALS — HEIGHT: 72 IN | WEIGHT: 160.83 LBS | BODY MASS INDEX: 21.78 KG/M2

## 2023-12-08 LAB
ANION GAP SERPL CALCULATED.3IONS-SCNC: 9 MMOL/L (ref 5–15)
BUN SERPL-MCNC: 9 MG/DL (ref 6–20)
BUN/CREAT SERPL: 9.6 (ref 7–25)
CALCIUM SPEC-SCNC: 9.9 MG/DL (ref 8.6–10.5)
CHLORIDE SERPL-SCNC: 104 MMOL/L (ref 98–107)
CO2 SERPL-SCNC: 28 MMOL/L (ref 22–29)
CREAT SERPL-MCNC: 0.94 MG/DL (ref 0.76–1.27)
DEPRECATED RDW RBC AUTO: 39.6 FL (ref 37–54)
EGFRCR SERPLBLD CKD-EPI 2021: 101.2 ML/MIN/1.73
ERYTHROCYTE [DISTWIDTH] IN BLOOD BY AUTOMATED COUNT: 11.6 % (ref 12.3–15.4)
GLUCOSE SERPL-MCNC: 99 MG/DL (ref 65–99)
HCT VFR BLD AUTO: 44 % (ref 37.5–51)
HGB BLD-MCNC: 14.8 G/DL (ref 13–17.7)
MCH RBC QN AUTO: 31.1 PG (ref 26.6–33)
MCHC RBC AUTO-ENTMCNC: 33.6 G/DL (ref 31.5–35.7)
MCV RBC AUTO: 92.4 FL (ref 79–97)
PLATELET # BLD AUTO: 194 10*3/MM3 (ref 140–450)
PMV BLD AUTO: 9.3 FL (ref 6–12)
POTASSIUM SERPL-SCNC: 4.6 MMOL/L (ref 3.5–5.2)
QT INTERVAL: 370 MS
QTC INTERVAL: 387 MS
RBC # BLD AUTO: 4.76 10*6/MM3 (ref 4.14–5.8)
SODIUM SERPL-SCNC: 141 MMOL/L (ref 136–145)
WBC NRBC COR # BLD AUTO: 3.93 10*3/MM3 (ref 3.4–10.8)

## 2023-12-08 PROCEDURE — 85027 COMPLETE CBC AUTOMATED: CPT

## 2023-12-08 PROCEDURE — 93005 ELECTROCARDIOGRAM TRACING: CPT

## 2023-12-08 PROCEDURE — 80048 BASIC METABOLIC PNL TOTAL CA: CPT

## 2023-12-08 PROCEDURE — 36415 COLL VENOUS BLD VENIPUNCTURE: CPT

## 2023-12-08 NOTE — PAT
An arrival time for procedure was not provided during PAT visit. If patient had any questions or concerns about their arrival time, they were instructed to contact their surgeon/physician.  Additionally, if the patient referred to an arrival time that was acquired from their my chart account, patient was encouraged to verify that time with their surgeon/physician. Arrival times are NOT provided in Pre Admission Testing Department.    Patient to apply Chlorhexadine wipes  to surgical area (as instructed) the night before procedure and the AM of procedure. Wipes provided.    Patient denies any current skin issues.     Patient viewed general PAT education video as instructed in their preoperative information received from their surgeon.  Patient stated the general PAT education video was viewed in its entirety and survey completed.  Copies of PAT general education handouts (Incentive Spirometry, Meds to Beds Program, Patient Belongings, Pre-op skin preparation instructions, Blood Glucose testing, Visitor policy, Surgery FAQ, Code H) distributed to patient if not printed. Education related to the PAT pass and skin preparation for surgery (if applicable) completed in PAT as a reinforcement to PAT education video. Patient instructed to return PAT pass provided today as well as completed skin preparation sheet (if applicable) on the day of procedure.     Additionally if patient had not viewed video yet but intended to view it at home or in our waiting area, then referred them to the handout with QR code/link provided during PAT visit.  Instructed patient to complete survey after viewing the video in its entirety.  Encouraged patient/family to read PAT general education handouts thoroughly and notify PAT staff with any questions or concerns. Patient verbalized understanding of all information and priority content.    PATIENT EKG ON CHART AND IN EPIC FROM 12/08/2023

## 2023-12-15 ENCOUNTER — ANESTHESIA EVENT (OUTPATIENT)
Dept: PERIOP | Facility: HOSPITAL | Age: 48
End: 2023-12-15
Payer: COMMERCIAL

## 2023-12-15 ENCOUNTER — HOSPITAL ENCOUNTER (OUTPATIENT)
Facility: HOSPITAL | Age: 48
Setting detail: HOSPITAL OUTPATIENT SURGERY
Discharge: HOME | End: 2023-12-15
Attending: STUDENT IN AN ORGANIZED HEALTH CARE EDUCATION/TRAINING PROGRAM | Admitting: STUDENT IN AN ORGANIZED HEALTH CARE EDUCATION/TRAINING PROGRAM
Payer: COMMERCIAL

## 2023-12-15 ENCOUNTER — TELEPHONE (OUTPATIENT)
Dept: FAMILY MEDICINE CLINIC | Facility: CLINIC | Age: 48
End: 2023-12-15
Payer: COMMERCIAL

## 2023-12-15 ENCOUNTER — ANESTHESIA (OUTPATIENT)
Dept: PERIOP | Facility: HOSPITAL | Age: 48
End: 2023-12-15
Payer: COMMERCIAL

## 2023-12-15 VITALS
RESPIRATION RATE: 16 BRPM | HEART RATE: 81 BPM | TEMPERATURE: 98 F | OXYGEN SATURATION: 96 % | DIASTOLIC BLOOD PRESSURE: 98 MMHG | BODY MASS INDEX: 21.67 KG/M2 | HEIGHT: 72 IN | SYSTOLIC BLOOD PRESSURE: 154 MMHG | WEIGHT: 160 LBS

## 2023-12-15 DIAGNOSIS — K40.90 RIGHT INGUINAL HERNIA: Primary | ICD-10-CM

## 2023-12-15 PROCEDURE — 25010000002 PROPOFOL 10 MG/ML EMULSION: Performed by: ANESTHESIOLOGY

## 2023-12-15 PROCEDURE — 25010000002 CEFAZOLIN PER 500 MG: Performed by: STUDENT IN AN ORGANIZED HEALTH CARE EDUCATION/TRAINING PROGRAM

## 2023-12-15 PROCEDURE — 25810000003 LACTATED RINGERS PER 1000 ML: Performed by: ANESTHESIOLOGY

## 2023-12-15 PROCEDURE — 25810000003 SODIUM CHLORIDE PER 500 ML: Performed by: STUDENT IN AN ORGANIZED HEALTH CARE EDUCATION/TRAINING PROGRAM

## 2023-12-15 PROCEDURE — 25010000002 FENTANYL CITRATE (PF) 100 MCG/2ML SOLUTION: Performed by: ANESTHESIOLOGY

## 2023-12-15 PROCEDURE — 25010000002 HEPARIN (PORCINE) PER 1000 UNITS: Performed by: STUDENT IN AN ORGANIZED HEALTH CARE EDUCATION/TRAINING PROGRAM

## 2023-12-15 PROCEDURE — 25010000002 DEXAMETHASONE PER 1 MG: Performed by: ANESTHESIOLOGY

## 2023-12-15 PROCEDURE — C1781 MESH (IMPLANTABLE): HCPCS | Performed by: STUDENT IN AN ORGANIZED HEALTH CARE EDUCATION/TRAINING PROGRAM

## 2023-12-15 PROCEDURE — 25010000002 SUGAMMADEX 200 MG/2ML SOLUTION: Performed by: ANESTHESIOLOGY

## 2023-12-15 DEVICE — DEV WND/CLS STRATAFIX SPIRALPDS PLS CT 2/0 15CM 26MM VIL: Type: IMPLANTABLE DEVICE | Site: ABDOMEN | Status: FUNCTIONAL

## 2023-12-15 DEVICE — BARD 3DMAX MESH RIGHT LARGE
Type: IMPLANTABLE DEVICE | Site: ABDOMEN | Status: FUNCTIONAL
Brand: BARD 3DMAX MESH

## 2023-12-15 RX ORDER — HYDROCODONE BITARTRATE AND ACETAMINOPHEN 5; 325 MG/1; MG/1
TABLET ORAL
Status: COMPLETED
Start: 2023-12-15 | End: 2023-12-15

## 2023-12-15 RX ORDER — FENTANYL CITRATE 50 UG/ML
50 INJECTION, SOLUTION INTRAMUSCULAR; INTRAVENOUS
Status: DISCONTINUED | OUTPATIENT
Start: 2023-12-15 | End: 2023-12-15 | Stop reason: HOSPADM

## 2023-12-15 RX ORDER — TRAMADOL HYDROCHLORIDE 50 MG/1
50 TABLET ORAL EVERY 6 HOURS PRN
Qty: 15 TABLET | Refills: 0 | Status: SHIPPED | OUTPATIENT
Start: 2023-12-15

## 2023-12-15 RX ORDER — LIDOCAINE HYDROCHLORIDE 10 MG/ML
INJECTION, SOLUTION EPIDURAL; INFILTRATION; INTRACAUDAL; PERINEURAL AS NEEDED
Status: DISCONTINUED | OUTPATIENT
Start: 2023-12-15 | End: 2023-12-15 | Stop reason: SURG

## 2023-12-15 RX ORDER — EPHEDRINE SULFATE 50 MG/ML
INJECTION INTRAVENOUS AS NEEDED
Status: DISCONTINUED | OUTPATIENT
Start: 2023-12-15 | End: 2023-12-15 | Stop reason: SURG

## 2023-12-15 RX ORDER — NEBIVOLOL 5 MG/1
5 TABLET ORAL ONCE
Qty: 1 TABLET | Refills: 0 | Status: COMPLETED | OUTPATIENT
Start: 2023-12-15 | End: 2023-12-15

## 2023-12-15 RX ORDER — ONDANSETRON 2 MG/ML
4 INJECTION INTRAMUSCULAR; INTRAVENOUS ONCE AS NEEDED
Status: DISCONTINUED | OUTPATIENT
Start: 2023-12-15 | End: 2023-12-15 | Stop reason: HOSPADM

## 2023-12-15 RX ORDER — HEPARIN SODIUM 5000 [USP'U]/ML
5000 INJECTION, SOLUTION INTRAVENOUS; SUBCUTANEOUS ONCE
Status: COMPLETED | OUTPATIENT
Start: 2023-12-15 | End: 2023-12-15

## 2023-12-15 RX ORDER — SODIUM CHLORIDE, SODIUM LACTATE, POTASSIUM CHLORIDE, CALCIUM CHLORIDE 600; 310; 30; 20 MG/100ML; MG/100ML; MG/100ML; MG/100ML
INJECTION, SOLUTION INTRAVENOUS CONTINUOUS PRN
Status: DISCONTINUED | OUTPATIENT
Start: 2023-12-15 | End: 2023-12-15 | Stop reason: SURG

## 2023-12-15 RX ORDER — HYDROMORPHONE HYDROCHLORIDE 1 MG/ML
0.5 INJECTION, SOLUTION INTRAMUSCULAR; INTRAVENOUS; SUBCUTANEOUS
Status: DISCONTINUED | OUTPATIENT
Start: 2023-12-15 | End: 2023-12-15 | Stop reason: HOSPADM

## 2023-12-15 RX ORDER — FENTANYL CITRATE 50 UG/ML
INJECTION, SOLUTION INTRAMUSCULAR; INTRAVENOUS AS NEEDED
Status: DISCONTINUED | OUTPATIENT
Start: 2023-12-15 | End: 2023-12-15 | Stop reason: SURG

## 2023-12-15 RX ORDER — LIDOCAINE HYDROCHLORIDE 10 MG/ML
0.2 INJECTION, SOLUTION INFILTRATION; PERINEURAL ONCE
Status: COMPLETED | OUTPATIENT
Start: 2023-12-15 | End: 2023-12-15

## 2023-12-15 RX ORDER — DEXAMETHASONE SODIUM PHOSPHATE 4 MG/ML
INJECTION, SOLUTION INTRA-ARTICULAR; INTRALESIONAL; INTRAMUSCULAR; INTRAVENOUS; SOFT TISSUE AS NEEDED
Status: DISCONTINUED | OUTPATIENT
Start: 2023-12-15 | End: 2023-12-15 | Stop reason: SURG

## 2023-12-15 RX ORDER — PROPOFOL 10 MG/ML
VIAL (ML) INTRAVENOUS AS NEEDED
Status: DISCONTINUED | OUTPATIENT
Start: 2023-12-15 | End: 2023-12-15 | Stop reason: SURG

## 2023-12-15 RX ORDER — PHENYLEPHRINE HCL IN 0.9% NACL 1 MG/10 ML
SYRINGE (ML) INTRAVENOUS AS NEEDED
Status: DISCONTINUED | OUTPATIENT
Start: 2023-12-15 | End: 2023-12-15 | Stop reason: SURG

## 2023-12-15 RX ORDER — FAMOTIDINE 20 MG/1
20 TABLET, FILM COATED ORAL ONCE
Status: COMPLETED | OUTPATIENT
Start: 2023-12-15 | End: 2023-12-15

## 2023-12-15 RX ORDER — DROPERIDOL 2.5 MG/ML
0.62 INJECTION, SOLUTION INTRAMUSCULAR; INTRAVENOUS ONCE AS NEEDED
Status: DISCONTINUED | OUTPATIENT
Start: 2023-12-15 | End: 2023-12-15 | Stop reason: HOSPADM

## 2023-12-15 RX ORDER — NEBIVOLOL 5 MG/1
5 TABLET ORAL ONCE
Status: COMPLETED | OUTPATIENT
Start: 2023-12-15 | End: 2023-12-15

## 2023-12-15 RX ORDER — SODIUM CHLORIDE, SODIUM LACTATE, POTASSIUM CHLORIDE, CALCIUM CHLORIDE 600; 310; 30; 20 MG/100ML; MG/100ML; MG/100ML; MG/100ML
9 INJECTION, SOLUTION INTRAVENOUS CONTINUOUS
Status: DISCONTINUED | OUTPATIENT
Start: 2023-12-15 | End: 2023-12-15 | Stop reason: HOSPADM

## 2023-12-15 RX ORDER — ROCURONIUM BROMIDE 10 MG/ML
INJECTION, SOLUTION INTRAVENOUS AS NEEDED
Status: DISCONTINUED | OUTPATIENT
Start: 2023-12-15 | End: 2023-12-15 | Stop reason: SURG

## 2023-12-15 RX ORDER — HYDROCODONE BITARTRATE AND ACETAMINOPHEN 5; 325 MG/1; MG/1
1 TABLET ORAL ONCE AS NEEDED
Status: COMPLETED | OUTPATIENT
Start: 2023-12-15 | End: 2023-12-15

## 2023-12-15 RX ORDER — MAGNESIUM HYDROXIDE 1200 MG/15ML
LIQUID ORAL AS NEEDED
Status: DISCONTINUED | OUTPATIENT
Start: 2023-12-15 | End: 2023-12-15 | Stop reason: HOSPADM

## 2023-12-15 RX ORDER — SODIUM CHLORIDE 9 MG/ML
INJECTION, SOLUTION INTRAVENOUS AS NEEDED
Status: DISCONTINUED | OUTPATIENT
Start: 2023-12-15 | End: 2023-12-15 | Stop reason: HOSPADM

## 2023-12-15 RX ADMIN — ROCURONIUM BROMIDE 50 MG: 10 SOLUTION INTRAVENOUS at 11:08

## 2023-12-15 RX ADMIN — SODIUM CHLORIDE 2000 MG: 900 INJECTION INTRAVENOUS at 11:15

## 2023-12-15 RX ADMIN — LIDOCAINE HYDROCHLORIDE 0.2 ML: 10 INJECTION, SOLUTION EPIDURAL; INFILTRATION; INTRACAUDAL; PERINEURAL at 10:35

## 2023-12-15 RX ADMIN — EPHEDRINE SULFATE 10 MG: 50 INJECTION INTRAVENOUS at 12:30

## 2023-12-15 RX ADMIN — NEBIVOLOL 5 MG: 5 TABLET ORAL at 10:43

## 2023-12-15 RX ADMIN — HYDROCODONE BITARTRATE AND ACETAMINOPHEN 1 TABLET: 5; 325 TABLET ORAL at 14:39

## 2023-12-15 RX ADMIN — Medication 200 MCG: at 11:52

## 2023-12-15 RX ADMIN — EPHEDRINE SULFATE 10 MG: 50 INJECTION INTRAVENOUS at 11:55

## 2023-12-15 RX ADMIN — ROCURONIUM BROMIDE 10 MG: 10 SOLUTION INTRAVENOUS at 12:17

## 2023-12-15 RX ADMIN — SODIUM CHLORIDE, POTASSIUM CHLORIDE, SODIUM LACTATE AND CALCIUM CHLORIDE 9 ML/HR: 600; 310; 30; 20 INJECTION, SOLUTION INTRAVENOUS at 10:35

## 2023-12-15 RX ADMIN — HEPARIN SODIUM 5000 UNITS: 5000 INJECTION INTRAVENOUS; SUBCUTANEOUS at 10:53

## 2023-12-15 RX ADMIN — NEBIVOLOL 5 MG: 5 TABLET ORAL at 10:50

## 2023-12-15 RX ADMIN — LIDOCAINE HYDROCHLORIDE 50 MG: 10 INJECTION, SOLUTION EPIDURAL; INFILTRATION; INTRACAUDAL; PERINEURAL at 11:08

## 2023-12-15 RX ADMIN — DEXAMETHASONE SODIUM PHOSPHATE 4 MG: 4 INJECTION, SOLUTION INTRAMUSCULAR; INTRAVENOUS at 11:23

## 2023-12-15 RX ADMIN — FAMOTIDINE 20 MG: 20 TABLET, FILM COATED ORAL at 10:43

## 2023-12-15 RX ADMIN — ROCURONIUM BROMIDE 20 MG: 10 SOLUTION INTRAVENOUS at 11:30

## 2023-12-15 RX ADMIN — SODIUM CHLORIDE, POTASSIUM CHLORIDE, SODIUM LACTATE AND CALCIUM CHLORIDE: 600; 310; 30; 20 INJECTION, SOLUTION INTRAVENOUS at 11:00

## 2023-12-15 RX ADMIN — PROPOFOL 200 MG: 10 INJECTION, EMULSION INTRAVENOUS at 11:08

## 2023-12-15 RX ADMIN — Medication 100 MCG: at 11:35

## 2023-12-15 RX ADMIN — FENTANYL CITRATE 100 MCG: 50 INJECTION, SOLUTION INTRAMUSCULAR; INTRAVENOUS at 11:08

## 2023-12-15 RX ADMIN — Medication 200 MCG: at 11:42

## 2023-12-15 RX ADMIN — SUGAMMADEX 200 MG: 100 INJECTION, SOLUTION INTRAVENOUS at 12:40

## 2023-12-15 NOTE — TELEPHONE ENCOUNTER
Attempted to call patient no answer. Left Vm. Relaying message from Martha. Pt. Will need to schedule an appt. In order to receive completed [employer] paperwork.      Okay for [facility] to schedule

## 2023-12-15 NOTE — ANESTHESIA PREPROCEDURE EVALUATION
Anesthesia Evaluation     Patient summary reviewed and Nursing notes reviewed   NPO Solid Status: > 8 hours  NPO Liquid Status: > 2 hours           Airway   Mallampati: II  TM distance: >3 FB  Neck ROM: full  No difficulty expected  Dental      Pulmonary     breath sounds clear to auscultation  Cardiovascular     ECG reviewed  Rhythm: regular  Rate: normal    (+) hypertension      Neuro/Psych  GI/Hepatic/Renal/Endo    (+) GERD    Musculoskeletal     Abdominal    Substance History      OB/GYN          Other                      Anesthesia Plan    ASA 2     general     (+/- tap'S)  intravenous induction     Anesthetic plan, risks, benefits, and alternatives have been provided, discussed and informed consent has been obtained with: patient.    Plan discussed with CRNA.    CODE STATUS:

## 2023-12-15 NOTE — H&P
"  Pre-Op H&P  Alex Fofana  [id number]  1975      Chief complaint: Inguinal hernia      Subjective:  Patient is a 47 y.o.male presents for scheduled surgery by . He anticipates a RIGHT INGUINAL HERNIA REPAIR POSSIBLE LEFT INGUINAL HERNIA REPAIR LAPAROSCOPIC WITH DAVINCI ROBOT  today. He developed right groin pain in October. He noted a bugle protrude. He reports problems with constipation. Symptoms worse with lifting or straining.       Review of Systems:  Constitutional-- No fever, chills or sweats. No fatigue.  CV-- No chest pain, palpitation or syncope  Resp-- No SOB, cough, hemoptysis  Skin--No rashes or lesions      Allergies:   Allergies   Allergen Reactions    Erythromycin          Home Meds:  Medications Prior to Admission   Medication Sig Dispense Refill Last Dose    nebivolol (Bystolic) 5 MG tablet Take 1 tablet by mouth Daily. 30 tablet 1 12/14/2023         PMH:   Past Medical History:   Diagnosis Date    GERD (gastroesophageal reflux disease)     Hypertension     Migraines      PSH:    Past Surgical History:   Procedure Laterality Date    ENDOSCOPY         Immunization History:  Influenza: No  Pneumococcal: No  Tetanus: UTD  Covid : No    Social History:   Tobacco:   Social History     Tobacco Use   Smoking Status Never    Passive exposure: Never   Smokeless Tobacco Never      Alcohol:     Social History     Substance and Sexual Activity   Alcohol Use Not Currently    Comment: quit in 2016         Physical Exam:BP (!) 187/114 (BP Location: Right arm, Patient Position: Sitting)   Pulse 90   Temp 98.5 °F (36.9 °C) (Temporal)   Resp 18   Ht 182.9 cm (72\")   Wt 72.6 kg (160 lb)   SpO2 98%   BMI 21.70 kg/m²       General Appearance:    Alert, cooperative, no distress, appears stated age   Head:    Normocephalic, without obvious abnormality, atraumatic   Lungs:     Clear to auscultation bilaterally, respirations unlabored    Heart:   Regular rate and rhythm, S1 and S2 normal    " Abdomen:    Soft without tenderness   Extremities:   Extremities normal, atraumatic, no cyanosis or edema   Skin:   Skin color, texture, turgor normal, no rashes or lesions   Neurologic:   Grossly intact     Results Review:     LABS:  Lab Results   Component Value Date    WBC 3.93 12/08/2023    HGB 14.8 12/08/2023    HCT 44.0 12/08/2023    MCV 92.4 12/08/2023     12/08/2023    NEUTROABS 2.82 11/20/2023    GLUCOSE 99 12/08/2023    BUN 9 12/08/2023    CREATININE 0.94 12/08/2023    EGFRIFAFRI 130 10/02/2019     12/08/2023    K 4.6 12/08/2023     12/08/2023    CO2 28.0 12/08/2023    MG 1.8 10/21/2023    CALCIUM 9.9 12/08/2023    ALBUMIN 4.6 11/20/2023    AST 19 11/20/2023    ALT 17 11/20/2023    BILITOT 0.4 11/20/2023       RADIOLOGY:  Imaging Results (Last 72 Hours)       ** No results found for the last 72 hours. **            I reviewed the patient's new clinical results.    Cancer Staging (if applicable)  Cancer Patient: __ yes __no __unknown; If yes, clinical stage T:__ N:__M:__, stage group or __N/A      Impression: inguinal hernia      Plan: RIGHT INGUINAL HERNIA REPAIR POSSIBLE LEFT INGUINAL HERNIA REPAIR LAPAROSCOPIC WITH DAVINCI ROBOT       JOHN Vela   12/15/2023   10:39 EST

## 2023-12-15 NOTE — DISCHARGE INSTRUCTIONS
[facility]:  DISCHARGE INSTRUCTIONS  Dr. Zach Stokes    DIET  Okay to resume a regular diet.      PAIN MANAGEMENT  Pain is normal after surgery, but should be able to be managed.     Recommend alternatin mg acetaminophen (Tylenol) every 6 hours*   600-800 mg ibuprofen (Motrin, Advil, etc.) every 6 hours  *Do not take more than 4000 mg of Tylenol in a single day.     If you received a prescription for pain medication after surgery, use this for breakthrough moderate or severe pain if not covered by acetaminophen or ibuprofen.  Okay to use warm and cool compresses.     Bowel Regimen  Prescribed pain medications may cause constipation. Any over-the-counter bowel regimen medications will help with these symptoms.     Recommended regimen:  Miralax 17g in zero sugar Gatorade/Powerade once daily  Senna laxative once to twice daily  Metamucil or other fiber supplement daily  If still constipated - milk of Magnesia or okay to try suppositories or enemas unless directed otherwise by surgeon      DISCHARGE ACTIVITY  Activity is as tolerated.   Okay to walk the night of surgery. Recommend walking at least 4 times daily to prevent complications  No excessive twisting/bending/lifting greater than 20 lbs for 2 weeks.  May return to more strenuous exercise as pain and lifting restrictions allow. Would avoid strenuous activity for at least 1-2 weeks to allow incision to heal.    Driving  You may not drive within 24 hour of receiving narcotic pain medication.   Okay to drive when not taking narcotic pain medication and your incision is not causing limitations with your reaction speed to traffic.    Return to work/school  You may return to work/school in 1-2 weeks with the above restrictions.  You may return to work/school without restrictions in 4 weeks or when your pain/activity allows.    WOUND CARE    Shower/Bathing  You may shower after 24 hours from the surgical procedure.   No tub baths, do not  submerge the incision underwater in a tub bath etc.      Wound Dressing  If dressed with adhesive glue, okay to follow shower/bathing instructions above. Keep site clean and dry.    If dressed with gauze bandages and steri strips. Okay to remove the outer bandage immediately after exiting your first shower and remove the Steri-Strips if still in place after 7 days.    Please call our office, [phone], if you develop increased pain, redness, yellow/purulent discharge, or fevers/chills as this may indicate an infection      FOLLOW-UP  You will be scheduled a follow-up appointment 1-2 weeks after discharge.   [facility] will call you to schedule.    If you do not hear from anyone to schedule, please call [phone] to ask for an appointment 2 weeks from your surgery or discharge date.        CONCERNING SIGNS AND SYMPTOMS  1. Fevers/chills/flu-like symptoms  2. Increasing pain at the surgical site  3. Redness around incisions  4. Purulent drainage from incisions  5. Any other symptoms that are concerning to you    If you develop any of the signs or symptoms above, please call our office ([phone]) or after-hours line (). If unable to reach us, or symptoms are severe, please go to the ER for evaluation.

## 2023-12-15 NOTE — ANESTHESIA POSTPROCEDURE EVALUATION
Patient: Alex Fofana    Procedure Summary       Date: 12/15/23 Room / Location:  [facility] [address] [place] [address] [facility]    Anesthesia Start: 1100 Anesthesia Stop: 1303    Procedure: RIGHT INGUINAL HERNIA REPAIR LAPAROSCOPIC WITH DAVINCI ROBOT (Right: Abdomen) Diagnosis:     Surgeons: Zach Stokes MD Provider: Jamie Reese MD    Anesthesia Type: general ASA Status: 2            Anesthesia Type: general    Vitals  Vitals Value Taken Time   /95 12/15/23 1300   Temp 98.7 °F (37.1 °C) 12/15/23 1300   Pulse 91 12/15/23 1302   Resp 16 12/15/23 1300   SpO2 100 % 12/15/23 1302   Vitals shown include unfiled device data.        Post Anesthesia Care and Evaluation    Patient location during evaluation: PACU  Patient participation: complete - patient participated  Level of consciousness: awake and responsive to verbal stimuli  Pain management: adequate    Airway patency: patent  Anesthetic complications: No anesthetic complications  PONV Status: none  Cardiovascular status: hemodynamically stable and acceptable  Respiratory status: nonlabored ventilation, acceptable and nasal cannula  Hydration status: acceptable

## 2023-12-15 NOTE — OP NOTE
Surgical Oncology Operative Note    INGUINAL HERNIA BILATERAL REPAIR LAPAROSCOPIC WITH DAVINCI ROBOT  Procedure Report    Patient Name:  Alex Fofana  YOB: 1975  [id number]     Date of Surgery:  12/15/2023       Pre-op Diagnosis: Right inguinal hernia    Post-op Diagnosis: Direct right inguinal hernia      Procedure:   Robotic right inguinal hernia repair    Surgeon: Zach Stokes MD    Assistant:    None    Anesthesia: General with Block         Estimated Blood Loss: minimal    Complications: None apparent    Implants:    Implant Name Type Inv. Item Serial No.  Lot No. LRB No. Used Action   DEV WND/CLS STRATAFIX SPIRALPDS PLS CT 2/0 15CM 26MM NUHA - [id number] Implant DEV WND/CLS STRATAFIX SPIRALPDS PLS CT 2/0 15CM 26MM NUHA  [employer]  DIV OF J AND J [id number] Right 1 Implanted   MESH 3DMAX 4X6IN LG RT - [id number] Implant MESH 3DMAX 4X6IN LG RT  DAVOL  (DIV OF CR [employer]) [id number] Right 1 Implanted       Specimen:          None      Findings: Right direct inguinal hernia. No hernia on the left.     Indications:  Mr. Alex Fofana is a 47M w/ a symptomatic and reducible right inguinal hernia. This was confirmed on MRI. He was taken to the OR for repair.     Description of Procedure:   After obtaining consent, the patient was brought into the operating table and placed in supine position. SCD boots were placed to bilateral lower extremities. General anesthesia was administered without complication and the patient was intubated without difficulty. The patient’s abdomen was prepped and draped in the usual sterile fashion. Appropriate antibiotic prophylaxis was administered.     After an appropriate surgical pause and time-out was completed, a supraumbilical incision was made. The abdomen was accessed with a Veress needle and the peritoneal cavity insufflated to 15 mmHg after a confirmatory saline drop test and low opening pressure. An 8 mm robotic port was placed using the hybris  technique. Two additional 8 mm ports were placed in the right and left abdomen. The robot was then docked.     A direct inguinal hernia was appreciated on the right side and no hernias were appreciated on the left side.     Attention was turned to the right side. A peritoneal flap was created starting several cm cephalad from the hernia defect and internal ring starting at the medial umbilical ligament. The peritoneal incision was carried laterally and slightly inferiorly along the medial umbilical ligament. A preperitoneal plane was developed using blunt dissection with careful attention to cauterize small vessels. The epigastric vessels were kept superior. The pubic tubercle and Norberto's ligament were identified. The direct hernia sac was reduced. Peritoneum was widely mobilized laterally and tracked to where it was crossing the spermatic cord structures. The spermatic cord was isolated from the peritoneum which was reflected cephalad. There was no indirect hernia. All leading edges were divided. A polypropylene mesh was  inserted to cover the entire myopectineal orifice defect. The mesh was sutured medially to the pubic tubercle and anteriorly and laterally above the iliopubic tract to the anterior abdominal wall using 2-0 Vicryl. The peritoneum was closed over the mesh using a 2-0 Stratafix suture.      The abdomen was again checked for injuries. The robot was undocked, peritoneal cavity desufflated, and ports were withdrawn. All incisions were closed with 4-0 Monocryl sutures. They were dressed with Steri strips and sterile dressing.    All sponge and needle counts were correct times two at the completion of the procedure. The patient recovered from anesthesia, was extubated in the operating room and was transported to the PACU in stable condition.          Zach Stokes MD     Date: 12/15/2023  Time: 12:47 EST

## 2023-12-15 NOTE — ANESTHESIA PROCEDURE NOTES
Airway  Urgency: elective    Date/Time: 12/15/2023 11:10 AM  Airway not difficult    General Information and Staff    Patient location during procedure: OR    Indications and Patient Condition  Indications for airway management: airway protection    Preoxygenated: yes  MILS not maintained throughout  Mask difficulty assessment: 1 - vent by mask    Final Airway Details  Final airway type: endotracheal airway      Successful airway: ETT  Cuffed: yes   Successful intubation technique: direct laryngoscopy  Endotracheal tube insertion site: oral  Blade: Harris  Blade size: 2  ETT size (mm): 8.0  Cormack-Lehane Classification: grade I - full view of glottis  Placement verified by: chest auscultation and capnometry   Measured from: lips  ETT/EBT  to lips (cm): 20  Number of attempts at approach: 1  Assessment: lips, teeth, and gum same as pre-op and atraumatic intubation    Additional Comments  Negative epigastric sounds, Breath sound equal bilaterally with symmetric chest rise and fall

## 2023-12-16 NOTE — TELEPHONE ENCOUNTER
Video visit scheduled for 12/21. Please call pt if Martha prefers an in person visit for the paperwork.

## 2023-12-21 ENCOUNTER — TELEMEDICINE (OUTPATIENT)
Dept: FAMILY MEDICINE CLINIC | Facility: CLINIC | Age: 48
End: 2023-12-21
Payer: COMMERCIAL

## 2023-12-21 DIAGNOSIS — Z87.19 STATUS POST RIGHT INGUINAL HERNIA REPAIR: Primary | ICD-10-CM

## 2023-12-21 DIAGNOSIS — Z98.890 STATUS POST RIGHT INGUINAL HERNIA REPAIR: Primary | ICD-10-CM

## 2023-12-21 NOTE — PROGRESS NOTES
No chief complaint on file.    Mode of Visit: Video  Location of patient: Home  Location of Provider: [facility]  Patient has chosen to receive care through a telehealth visit.  Does the patient consent to use a video/audio device for their medical care today: Yes  The visit included audio and video interaction: Yes    HPI:  Alex Fofana is a 48 y.o. male who presents today via  for paperwork.    Had inguinal hernia repaired on 12/15/23.  Has recovered well postoperatively.  Only some mild discomfort along the right side of the hernia was located.  Has follow-up with surgeon in a couple of weeks.  Needs accommodation paperwork completed for headaches.  Works as a  there.  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  Was told by surgeon that he would likely need to remain off work for at least 4 weeks given the requirements of his job.      PE:  Gen- Awake, alert  Respiratory- Normal WOB  Neuro- Oriented, speech appropriate      Current Outpatient Medications   Medication Sig Dispense Refill    nebivolol (Bystolic) 5 MG tablet Take 1 tablet by mouth Daily. 30 tablet 1    traMADol (ULTRAM) 50 MG tablet Take 1 tablet by mouth Every 6 (Six) Hours As Needed for Moderate Pain. 15 tablet 0     No current facility-administered medications for this visit.        A/P:  Diagnoses and all orders for this visit:    1. Status post right inguinal hernia repair (Primary)    Accommodation paperwork completed and faxed to [employer]  Recommend 4-week gunner given significant physical requirements of his job including jumping on and off the truck, lifting >10 lbs times throughout the day  Follow-up as scheduled with surgeon.  If additional accommodations are required after that can update paperwork to reflect recommendations.    Follow-up as scheduled with PCP on 1/22/2024    Dictated Utilizing Dragon Dictation    Please note that  portions of this note were completed with a voice recognition program.    Part of this note may be an electronic transcription/translation of spoken language to printed text using the Dragon Dictation System.

## 2024-01-05 ENCOUNTER — OFFICE VISIT (OUTPATIENT)
Dept: FAMILY MEDICINE CLINIC | Facility: CLINIC | Age: 49
End: 2024-01-05
Payer: COMMERCIAL

## 2024-01-05 VITALS
OXYGEN SATURATION: 98 % | SYSTOLIC BLOOD PRESSURE: 152 MMHG | HEART RATE: 88 BPM | TEMPERATURE: 98.4 F | DIASTOLIC BLOOD PRESSURE: 90 MMHG | WEIGHT: 168.6 LBS | HEIGHT: 72 IN | BODY MASS INDEX: 22.84 KG/M2

## 2024-01-05 DIAGNOSIS — I10 PRIMARY HYPERTENSION: ICD-10-CM

## 2024-01-05 DIAGNOSIS — I10 UNCONTROLLED HYPERTENSION: ICD-10-CM

## 2024-01-05 PROCEDURE — 99213 OFFICE O/P EST LOW 20 MIN: CPT | Performed by: NURSE PRACTITIONER

## 2024-01-05 PROCEDURE — 3077F SYST BP >= 140 MM HG: CPT | Performed by: NURSE PRACTITIONER

## 2024-01-05 PROCEDURE — 3080F DIAST BP >= 90 MM HG: CPT | Performed by: NURSE PRACTITIONER

## 2024-01-05 PROCEDURE — 1159F MED LIST DOCD IN RCRD: CPT | Performed by: NURSE PRACTITIONER

## 2024-01-05 PROCEDURE — 1160F RVW MEDS BY RX/DR IN RCRD: CPT | Performed by: NURSE PRACTITIONER

## 2024-01-05 RX ORDER — NEBIVOLOL 5 MG/1
5 TABLET ORAL DAILY
Qty: 90 TABLET | Refills: 3 | Status: SHIPPED | OUTPATIENT
Start: 2024-01-05

## 2024-01-05 NOTE — LETTER
January 5, 2024     Patient: Alex Fofana   YOB: 1975   Date of Visit: 1/5/2024       To Whom It May Concern:    Alex Fofana should be excused from work from 10/21/23 to 12/14/23 for right inguinal hernia pain and swelling. He was advised to avoid any lifting during that time and had right inguinal hernia repair on 12/15/23. He will follow up with the surgeon, but at this time expect 4-6 weeks off from surgery date of 12/15/23.          Sincerely,        JOHN Pate    CC: No Recipients

## 2024-01-05 NOTE — PROGRESS NOTES
Subjective   Alex Fofana is a 48 y.o. male.   Chief Complaint   Patient presents with    Letter for School/Work     Pt. States he needs proof of care from Oct.21st through Dec.14th. Pt. States he has been off work since Oct.& needs something to give to his boss at work to continue employment.       History of Present Illness   Has been out of bystolic for a month, did not call for refills, states he continues to be fearful of medications. He had the right inguinal hernia repair 12/15/23, needs a letter to excuse him for the month prior to surgery when he could not lift. He will follow up with surgeon for return to work date.  The following portions of the patient's history were reviewed and updated as appropriate: allergies, current medications, past family history, past medical history, past social history, past surgical history, and problem list.    Review of Systems   Constitutional:  Negative for chills and fever.   Eyes:  Negative for visual disturbance.   Respiratory:  Negative for shortness of breath.    Cardiovascular:  Negative for chest pain and palpitations.   Gastrointestinal:  Positive for abdominal pain (improving right groin from hernia repair). Negative for constipation and diarrhea.   Genitourinary:  Negative for difficulty urinating and dysuria.   Neurological:  Positive for headaches. Negative for dizziness, light-headedness and numbness.       Objective   Physical Exam  Vitals reviewed.   Constitutional:       General: He is not in acute distress.     Appearance: Normal appearance. He is not ill-appearing, toxic-appearing or diaphoretic.   HENT:      Head: Normocephalic and atraumatic.   Cardiovascular:      Rate and Rhythm: Normal rate and regular rhythm.      Heart sounds: Normal heart sounds.   Pulmonary:      Effort: Pulmonary effort is normal. No respiratory distress.      Breath sounds: Normal breath sounds.   Musculoskeletal:      Right lower leg: No edema.      Left lower leg: No  "edema.   Neurological:      Mental Status: He is alert and oriented to person, place, and time.   Psychiatric:         Thought Content: Thought content normal.         Judgment: Judgment normal.       /90 (BP Location: Left arm, Patient Position: Sitting, Cuff Size: Adult)   Pulse 88   Temp 98.4 °F (36.9 °C) (Oral)   Ht 182.9 cm (72.01\")   Wt 76.5 kg (168 lb 9.6 oz)   SpO2 98%   BMI 22.86 kg/m²     Assessment & Plan   Problems Addressed this Visit          Cardiac and Vasculature    Hypertension    Relevant Medications    nebivolol (Bystolic) 5 MG tablet     Other Visit Diagnoses       Uncontrolled hypertension        Relevant Medications    nebivolol (Bystolic) 5 MG tablet          Diagnoses         Codes Comments    Primary hypertension     ICD-10-CM: I10  ICD-9-CM: 401.9     Uncontrolled hypertension     ICD-10-CM: I10  ICD-9-CM: 401.9           HTN is not controlled; again discussed dangers of not taking BP meds, follow up in a couple of weeks  Letter for work excuse given to patient as requested  Patient was encouraged to keep me informed of any acute changes, lack of improvement, or any new concerning symptoms.             "

## 2024-01-22 ENCOUNTER — OFFICE VISIT (OUTPATIENT)
Dept: FAMILY MEDICINE CLINIC | Facility: CLINIC | Age: 49
End: 2024-01-22
Payer: COMMERCIAL

## 2024-01-22 VITALS
HEIGHT: 72 IN | WEIGHT: 169.3 LBS | SYSTOLIC BLOOD PRESSURE: 136 MMHG | HEART RATE: 72 BPM | BODY MASS INDEX: 22.93 KG/M2 | DIASTOLIC BLOOD PRESSURE: 88 MMHG | OXYGEN SATURATION: 98 % | TEMPERATURE: 98.4 F

## 2024-01-22 DIAGNOSIS — Z98.890 HISTORY OF RIGHT INGUINAL HERNIA REPAIR: ICD-10-CM

## 2024-01-22 DIAGNOSIS — I10 PRIMARY HYPERTENSION: Primary | ICD-10-CM

## 2024-01-22 DIAGNOSIS — Z87.19 HISTORY OF RIGHT INGUINAL HERNIA REPAIR: ICD-10-CM

## 2024-01-22 PROCEDURE — 99214 OFFICE O/P EST MOD 30 MIN: CPT | Performed by: NURSE PRACTITIONER

## 2024-01-22 PROCEDURE — 1160F RVW MEDS BY RX/DR IN RCRD: CPT | Performed by: NURSE PRACTITIONER

## 2024-01-22 PROCEDURE — 1159F MED LIST DOCD IN RCRD: CPT | Performed by: NURSE PRACTITIONER

## 2024-01-22 PROCEDURE — 3075F SYST BP GE 130 - 139MM HG: CPT | Performed by: NURSE PRACTITIONER

## 2024-01-22 PROCEDURE — 3079F DIAST BP 80-89 MM HG: CPT | Performed by: NURSE PRACTITIONER

## 2024-01-22 NOTE — PROGRESS NOTES
Subjective   Alex Fofana is a 48 y.o. male.   Chief Complaint   Patient presents with    Hypertension       History of Present Illness   Patient is here for follow up for uncontrolled HTN. He restarted the bystolic.states headaches resolved, pressure with breathing has resolved.  Has followed up with his surgeon, states he continues to have pain at right inguinal hernia repair site. States he tried to shovel snow, but this increased his pain. His work involves heavy lifting, hooking dock plates to the truck, has not returned to work yet, is concerned he may cause new injury  The following portions of the patient's history were reviewed and updated as appropriate: allergies, current medications, past family history, past medical history, past social history, past surgical history, and problem list.    Review of Systems   Constitutional:  Negative for chills and fever.   Eyes:  Negative for visual disturbance.   Respiratory:  Negative for shortness of breath.    Cardiovascular:  Negative for chest pain and palpitations.   Gastrointestinal:  Positive for abdominal pain (right groin from hernia repair with lifting). Negative for constipation and diarrhea.   Genitourinary:  Negative for difficulty urinating and dysuria.   Neurological:  Negative for dizziness, light-headedness, numbness and headaches.       Objective   Physical Exam  Vitals reviewed.   Constitutional:       General: He is not in acute distress.     Appearance: Normal appearance. He is not ill-appearing, toxic-appearing or diaphoretic.   HENT:      Head: Normocephalic and atraumatic.   Cardiovascular:      Rate and Rhythm: Normal rate and regular rhythm.      Heart sounds: Normal heart sounds. No murmur heard.  Pulmonary:      Effort: Pulmonary effort is normal. No respiratory distress.      Breath sounds: Normal breath sounds.   Abdominal:      Tenderness: There is abdominal tenderness (right groin).   Neurological:      Mental Status: He is alert  "and oriented to person, place, and time.   Psychiatric:         Behavior: Behavior normal.         Thought Content: Thought content normal.         Judgment: Judgment normal.       /88 (BP Location: Right arm, Patient Position: Sitting, Cuff Size: Adult)   Pulse 72   Temp 98.4 °F (36.9 °C) (Oral)   Ht 182.9 cm (72.01\")   Wt 76.8 kg (169 lb 4.8 oz)   SpO2 98%   BMI 22.96 kg/m²     Assessment & Plan   Diagnoses and all orders for this visit:    1. Primary hypertension (Primary)    2. History of right inguinal hernia repair        HTN is better controlled, continue bystolic, again discussed the risks of stopping BP med, including increased risk of stroke  Patient will check with his employer to see if they have any options for light duty.  I reviewed Geoff Stokes note, patient can be released to work at 6 weeks which will be 1/26/24, but may take 6 months for complete healing and resolution of pain  Patient was encouraged to keep me informed of any acute changes, lack of improvement, or any new concerning symptoms.         Answers submitted by the patient for this visit:  Primary Reason for Visit (Submitted on 1/20/2024)  What is the primary reason for your visit?: Other  Other (Submitted on 1/20/2024)  Please describe your symptoms.: Pain in right side  Have you had these symptoms before?: Yes  How long have you been having these symptoms?: Greater than 2 weeks  Please list any medications you are currently taking for this condition.: None  Please describe any probable cause for these symptoms. : Healing from surgery    "

## 2024-01-24 ENCOUNTER — TELEPHONE (OUTPATIENT)
Dept: FAMILY MEDICINE CLINIC | Facility: CLINIC | Age: 49
End: 2024-01-24
Payer: COMMERCIAL

## 2024-01-24 NOTE — TELEPHONE ENCOUNTER
Patient stopped by to have Martha Lundberg fill out forms for his work. They need to be faxed to:  [phone]; ATTN:  FAVIOLA.    Copy scanned to chart & put in provider folder

## 2024-01-29 ENCOUNTER — TELEPHONE (OUTPATIENT)
Dept: FAMILY MEDICINE CLINIC | Facility: CLINIC | Age: 49
End: 2024-01-29
Payer: COMMERCIAL

## 2024-01-29 NOTE — TELEPHONE ENCOUNTER
Called Mr. Johnson to discuss his [employer] Paperwork. Pt. States he needs an excuse to meet the last excuse from 12/15/23-2/5/24. Pt. States he may return back to work Feb 5, 2024. Only if he feels better as far as pain levels, Binding and Lifting. Pt. States he still has Pain with binding/lifting small things around the house. Pt. States the objects that he lifts at work are much bigger. Pt. States he understands and has no further questions at this time.

## 2024-02-14 ENCOUNTER — PATIENT MESSAGE (OUTPATIENT)
Dept: FAMILY MEDICINE CLINIC | Facility: CLINIC | Age: 49
End: 2024-02-14
Payer: COMMERCIAL

## 2024-02-15 ENCOUNTER — TELEPHONE (OUTPATIENT)
Dept: FAMILY MEDICINE CLINIC | Facility: CLINIC | Age: 49
End: 2024-02-15
Payer: COMMERCIAL

## 2024-02-20 ENCOUNTER — OFFICE VISIT (OUTPATIENT)
Dept: FAMILY MEDICINE CLINIC | Facility: CLINIC | Age: 49
End: 2024-02-20
Payer: COMMERCIAL

## 2024-02-20 VITALS
BODY MASS INDEX: 22.73 KG/M2 | HEART RATE: 72 BPM | WEIGHT: 167.8 LBS | OXYGEN SATURATION: 99 % | HEIGHT: 72 IN | DIASTOLIC BLOOD PRESSURE: 80 MMHG | SYSTOLIC BLOOD PRESSURE: 136 MMHG

## 2024-02-20 DIAGNOSIS — I10 PRIMARY HYPERTENSION: Primary | ICD-10-CM

## 2024-02-20 DIAGNOSIS — Z87.19 HISTORY OF RIGHT INGUINAL HERNIA REPAIR: ICD-10-CM

## 2024-02-20 DIAGNOSIS — Z98.890 HISTORY OF RIGHT INGUINAL HERNIA REPAIR: ICD-10-CM

## 2024-02-20 PROCEDURE — 1160F RVW MEDS BY RX/DR IN RCRD: CPT | Performed by: NURSE PRACTITIONER

## 2024-02-20 PROCEDURE — 99213 OFFICE O/P EST LOW 20 MIN: CPT | Performed by: NURSE PRACTITIONER

## 2024-02-20 PROCEDURE — 1159F MED LIST DOCD IN RCRD: CPT | Performed by: NURSE PRACTITIONER

## 2024-02-20 PROCEDURE — 3079F DIAST BP 80-89 MM HG: CPT | Performed by: NURSE PRACTITIONER

## 2024-02-20 PROCEDURE — 3075F SYST BP GE 130 - 139MM HG: CPT | Performed by: NURSE PRACTITIONER

## 2024-02-20 NOTE — PROGRESS NOTES
Subjective   Alex Fofana is a 48 y.o. male.   Chief Complaint   Patient presents with    Letter for School/Work     Discuss Paperwork       History of Present Illness   Patient is here for follow up for HTN, he has been taking bystolic as prescribed.   He has not returned to work yet; he spoke to his supervisor, since he is still having pain with lifting ,his employer has delayed his return. His job at [employer], requires loading and heavy lifting.  Has to lift heavy metal plates/ dock plates, has to use all his weight to pull up on this.    The following portions of the patient's history were reviewed and updated as appropriate: allergies, current medications, past family history, past medical history, past social history, past surgical history, and problem list.    Review of Systems   Constitutional:  Negative for chills and fever.   Eyes:  Negative for visual disturbance.   Respiratory:  Negative for shortness of breath.    Cardiovascular:  Negative for chest pain and palpitations.   Gastrointestinal:  Positive for abdominal pain (right groin from hernia repair with lifting). Negative for constipation and diarrhea.   Genitourinary:  Negative for difficulty urinating and dysuria.   Neurological:  Negative for dizziness, light-headedness, numbness and headaches.       Objective   Physical Exam  Vitals reviewed.   Constitutional:       General: He is not in acute distress.     Appearance: Normal appearance. He is not ill-appearing, toxic-appearing or diaphoretic.   HENT:      Head: Normocephalic and atraumatic.   Neck:      Vascular: No carotid bruit.   Cardiovascular:      Rate and Rhythm: Normal rate and regular rhythm.      Heart sounds: Normal heart sounds.   Pulmonary:      Effort: Pulmonary effort is normal. No respiratory distress.      Breath sounds: Normal breath sounds.   Abdominal:      Tenderness: There is abdominal tenderness (mild at incision site).   Musculoskeletal:      Right lower leg: No  "edema.      Left lower leg: No edema.   Neurological:      Mental Status: He is alert and oriented to person, place, and time.   Psychiatric:         Thought Content: Thought content normal.         Judgment: Judgment normal.       /80 (BP Location: Right arm, Patient Position: Sitting, Cuff Size: Adult)   Pulse 72   Ht 182.9 cm (72.01\")   Wt 76.1 kg (167 lb 12.8 oz)   SpO2 99%   BMI 22.75 kg/m²     Assessment & Plan   Problems Addressed this Visit       Hypertension - Primary    History of right inguinal hernia repair     Diagnoses         Codes Comments    Primary hypertension    -  Primary ICD-10-CM: I10  ICD-9-CM: 401.9     History of right inguinal hernia repair     ICD-10-CM: Z98.890, Z87.19  ICD-9-CM: V45.89           HTN is improving, DASH diet; continue bystolic. Follow up in 3 months  Completed paper work for patient's FMLA. Discussed that he may not be able to return to this specific job since he has pain with lifting. I can not say that he is not at risk for injury since heavy lifting is required. Encouraged patient to follow  up with surgeon to discuss concerns  Patient was encouraged to keep me informed of any acute changes, lack of improvement, or any new concerning symptoms.             "

## (undated) DEVICE — DRP SURG UTIL W/TPE 2/LAYR 15X26IN DISP

## (undated) DEVICE — LAPAROVUE VISIBILITY SYSTEM LAPAROSCOPIC SOLUTIONS: Brand: LAPAROVUE

## (undated) DEVICE — PK LAP LASR CHOLE 10

## (undated) DEVICE — SNAP KOVER: Brand: UNBRANDED

## (undated) DEVICE — COLUMN DRAPE

## (undated) DEVICE — BLANKT WARM UPPR/BDY ARM/OUT 57X196CM

## (undated) DEVICE — VIOLET BRAIDED (POLYGLACTIN 910), SYNTHETIC ABSORBABLE SUTURE: Brand: COATED VICRYL

## (undated) DEVICE — PATIENT RETURN ELECTRODE, SINGLE-USE, CONTACT QUALITY MONITORING, ADULT, WITH 9FT CORD, FOR PATIENTS WEIGING OVER 33LBS. (15KG): Brand: MEGADYNE

## (undated) DEVICE — SUT MNCRYL PLS ANTIB UD 4/0 PS2 18IN

## (undated) DEVICE — CANNULA SEAL

## (undated) DEVICE — TIP COVER ACCESSORY

## (undated) DEVICE — ENDOPATH PNEUMONEEDLE INSUFFLATION NEEDLES WITH LUER LOCK CONNECTORS 120MM: Brand: ENDOPATH

## (undated) DEVICE — BLADELESS OBTURATOR: Brand: WECK VISTA

## (undated) DEVICE — GOWN SURG ORBIS LVL3 2XL STRL

## (undated) DEVICE — DRAPE,TOP,102X53,STERILE: Brand: MEDLINE

## (undated) DEVICE — TOTAL TRAY, 16FR 10ML SIL FOLEY, URN: Brand: MEDLINE

## (undated) DEVICE — GLV SURG BIOGEL LTX PF 7

## (undated) DEVICE — UNDRGLV SURG BIOGEL PUNCTUREINDICATION SZ7 PF STRL

## (undated) DEVICE — SOL ANTISTICK CAUTRY ELECTROLUBE LF

## (undated) DEVICE — ARM DRAPE

## (undated) DEVICE — ANTIBACTERIAL VIOLET BRAIDED (POLYGLACTIN 910), SYNTHETIC ABSORBABLE SUTURE: Brand: COATED VICRYL